# Patient Record
Sex: FEMALE | Race: WHITE | HISPANIC OR LATINO | Employment: STUDENT | URBAN - METROPOLITAN AREA
[De-identification: names, ages, dates, MRNs, and addresses within clinical notes are randomized per-mention and may not be internally consistent; named-entity substitution may affect disease eponyms.]

---

## 2023-08-18 ENCOUNTER — APPOINTMENT (OUTPATIENT)
Dept: LAB | Facility: CLINIC | Age: 23
End: 2023-08-18
Payer: COMMERCIAL

## 2023-08-18 DIAGNOSIS — Z02.1 PRE-EMPLOYMENT HEALTH SCREENING EXAMINATION: ICD-10-CM

## 2023-08-18 LAB
MEV IGG SER QL IA: ABNORMAL
MUV IGG SER QL IA: NORMAL
RUBV IGG SERPL IA-ACNC: 36 IU/ML
VZV IGG SER QL IA: ABNORMAL

## 2023-08-18 PROCEDURE — 86762 RUBELLA ANTIBODY: CPT

## 2023-08-18 PROCEDURE — 86480 TB TEST CELL IMMUN MEASURE: CPT

## 2023-08-18 PROCEDURE — 36415 COLL VENOUS BLD VENIPUNCTURE: CPT

## 2023-08-18 PROCEDURE — 86735 MUMPS ANTIBODY: CPT

## 2023-08-18 PROCEDURE — 86765 RUBEOLA ANTIBODY: CPT

## 2023-08-18 PROCEDURE — 86787 VARICELLA-ZOSTER ANTIBODY: CPT

## 2023-08-22 LAB
GAMMA INTERFERON BACKGROUND BLD IA-ACNC: 0.03 IU/ML
M TB IFN-G BLD-IMP: NEGATIVE
M TB IFN-G CD4+ BCKGRND COR BLD-ACNC: 0 IU/ML
M TB IFN-G CD4+ BCKGRND COR BLD-ACNC: 0 IU/ML
MITOGEN IGNF BCKGRD COR BLD-ACNC: >10 IU/ML

## 2023-11-12 ENCOUNTER — OFFICE VISIT (OUTPATIENT)
Dept: URGENT CARE | Facility: CLINIC | Age: 23
End: 2023-11-12
Payer: COMMERCIAL

## 2023-11-12 VITALS
HEIGHT: 61 IN | BODY MASS INDEX: 25.37 KG/M2 | RESPIRATION RATE: 17 BRPM | TEMPERATURE: 97.8 F | OXYGEN SATURATION: 99 % | WEIGHT: 134.4 LBS | HEART RATE: 87 BPM | DIASTOLIC BLOOD PRESSURE: 80 MMHG | SYSTOLIC BLOOD PRESSURE: 110 MMHG

## 2023-11-12 DIAGNOSIS — R30.0 DYSURIA: ICD-10-CM

## 2023-11-12 DIAGNOSIS — N39.0 URINARY TRACT INFECTION WITHOUT HEMATURIA, SITE UNSPECIFIED: Primary | ICD-10-CM

## 2023-11-12 LAB
SL AMB  POCT GLUCOSE, UA: ABNORMAL
SL AMB LEUKOCYTE ESTERASE,UA: ABNORMAL
SL AMB POCT BILIRUBIN,UA: ABNORMAL
SL AMB POCT BLOOD,UA: ABNORMAL
SL AMB POCT CLARITY,UA: CLEAR
SL AMB POCT COLOR,UA: ABNORMAL
SL AMB POCT KETONES,UA: ABNORMAL
SL AMB POCT NITRITE,UA: ABNORMAL
SL AMB POCT PH,UA: 6
SL AMB POCT SPECIFIC GRAVITY,UA: 1.01
SL AMB POCT URINE PROTEIN: ABNORMAL
SL AMB POCT UROBILINOGEN: 0.2

## 2023-11-12 PROCEDURE — 99213 OFFICE O/P EST LOW 20 MIN: CPT | Performed by: PHYSICIAN ASSISTANT

## 2023-11-12 PROCEDURE — 81002 URINALYSIS NONAUTO W/O SCOPE: CPT | Performed by: PHYSICIAN ASSISTANT

## 2023-11-12 RX ORDER — NITROFURANTOIN 25; 75 MG/1; MG/1
100 CAPSULE ORAL 2 TIMES DAILY
Qty: 10 CAPSULE | Refills: 0 | Status: SHIPPED | OUTPATIENT
Start: 2023-11-12 | End: 2023-11-17

## 2023-11-12 NOTE — PROGRESS NOTES
[facility]        NAME: Justina Alonso is a 26 y.o. female  : 2000    MRN: [id number]  DATE: 2023  TIME: 12:31 PM    Assessment and Plan   Urinary tract infection without hematuria, site unspecified [N39.0]  1. Urinary tract infection without hematuria, site unspecified  nitrofurantoin (MACROBID) 100 mg capsule      2. Dysuria  POCT urine dip        Patient Instructions   Urinary tract infection  UA dip- blood and leuks, urine culture and sensitivity sent out and will call if we need to change antibiotic  rx macrobid twice daily x 5 days  Increase fluid intake  Tylenol/ibuprofen as needed for pain  azostat as needed for pain    Follow up with PCP in 3-5 days. Proceed to  ER if symptoms worsen. Chief Complaint     Chief Complaint   Patient presents with    burning sensation during voiding     Burning sensation when voiding a cfew days ago. Afebrile when symptoms started         History of Present Illness       Radha Ventura is a 59-year-old female who presents to clinic complaining of dysuria x5 days. She also notes suprapubic pressure/pain after urination. She denies any fever, chills, hematuria, increased urinary frequency, urinary urgency, back pain, flank pain, or vaginal symptoms. Review of Systems   Review of Systems   Constitutional:  Negative for chills and fever. Gastrointestinal:  Positive for abdominal pain. Genitourinary:  Positive for dysuria. Negative for flank pain, frequency, hematuria, urgency, vaginal bleeding, vaginal discharge and vaginal pain. Musculoskeletal:  Negative for back pain.          Current Medications       Current Outpatient Medications:     nitrofurantoin (MACROBID) 100 mg capsule, Take 1 capsule (100 mg total) by mouth 2 (two) times a day for 5 days, Disp: 10 capsule, Rfl: 0    famotidine (PEPCID) 20 mg tablet, Take 1 tablet (20 mg total) by mouth 2 (two) times a day (Patient not taking: Reported on 2023), Disp: 30 tablet, Rfl: 0    lidocaine (Lidoderm) 5 %, Apply 1 patch topically over 12 hours daily as needed (back pain) Remove & Discard patch within 12 hours or as directed by MD (Patient not taking: Reported on 11/12/2023), Disp: 15 patch, Rfl: 0    ondansetron (Zofran ODT) 4 mg disintegrating tablet, Take 1 tablet (4 mg total) by mouth every 6 (six) hours as needed for nausea or vomiting (Patient not taking: Reported on 7/28/2023), Disp: 10 tablet, Rfl: 0    sodium chloride 1 g tablet, Take 1 tablet (1 g total) by mouth 2 (two) times a day (Patient not taking: Reported on 7/28/2023), Disp: 60 tablet, Rfl: 0    Current Allergies     Allergies as of 11/12/2023    (No Known Allergies)            The following portions of the patient's history were reviewed and updated as appropriate: allergies, current medications, past family history, past medical history, past social history, past surgical history and problem list.     Past Medical History:   Diagnosis Date    Gallstones     Internal hordeolum of right eye 8/8/2017    Migraines        History reviewed. No pertinent surgical history. Family History   Problem Relation Age of Onset    No Known Problems Mother     No Known Problems Father          Medications have been verified. Objective   /80   Pulse 87   Temp 97.8 °F (36.6 °C) (Tympanic)   Resp 17   Ht 5' 1" (1.549 m)   Wt 61 kg (134 lb 6.4 oz)   SpO2 99%   BMI 25.39 kg/m²   No LMP recorded. Physical Exam     Physical Exam  Vitals and nursing note reviewed. Constitutional:       General: She is not in acute distress. Appearance: Normal appearance. She is not ill-appearing. Cardiovascular:      Rate and Rhythm: Normal rate and regular rhythm. Heart sounds: Normal heart sounds. Pulmonary:      Effort: Pulmonary effort is normal.      Breath sounds: Normal breath sounds. Abdominal:      General: Bowel sounds are normal. There is no distension. Palpations: Abdomen is soft.       Tenderness: There is no abdominal tenderness. There is no right CVA tenderness, left CVA tenderness, guarding or rebound. Neurological:      Mental Status: She is alert and oriented to person, place, and time.    Psychiatric:         Mood and Affect: Mood normal.         Behavior: Behavior normal.

## 2023-11-14 LAB
BACTERIA UR CULT: NORMAL
Lab: NORMAL

## 2023-11-28 ENCOUNTER — APPOINTMENT (EMERGENCY)
Dept: RADIOLOGY | Facility: HOSPITAL | Age: 23
End: 2023-11-28
Payer: COMMERCIAL

## 2023-11-28 ENCOUNTER — HOSPITAL ENCOUNTER (OUTPATIENT)
Facility: HOSPITAL | Age: 23
Setting detail: OBSERVATION
Discharge: HOME | End: 2023-11-30
Attending: EMERGENCY MEDICINE | Admitting: INTERNAL MEDICINE
Payer: COMMERCIAL

## 2023-11-28 ENCOUNTER — HOSPITAL ENCOUNTER (EMERGENCY)
Facility: HOSPITAL | Age: 23
Discharge: HOME | End: 2023-11-28
Attending: EMERGENCY MEDICINE
Payer: COMMERCIAL

## 2023-11-28 VITALS
RESPIRATION RATE: 18 BRPM | OXYGEN SATURATION: 99 % | TEMPERATURE: 98.4 F | HEART RATE: 94 BPM | SYSTOLIC BLOOD PRESSURE: 109 MMHG | DIASTOLIC BLOOD PRESSURE: 58 MMHG

## 2023-11-28 DIAGNOSIS — O03.4 INCOMPLETE ABORTION: ICD-10-CM

## 2023-11-28 DIAGNOSIS — O03.9 SPONTANEOUS ABORTION: ICD-10-CM

## 2023-11-28 DIAGNOSIS — O03.4 INCOMPLETE ABORTION: Primary | ICD-10-CM

## 2023-11-28 DIAGNOSIS — N93.9 VAGINAL BLEEDING: Primary | ICD-10-CM

## 2023-11-28 DIAGNOSIS — D62 ABLA (ACUTE BLOOD LOSS ANEMIA): ICD-10-CM

## 2023-11-28 DIAGNOSIS — N93.9 VAGINAL BLEEDING: ICD-10-CM

## 2023-11-28 DIAGNOSIS — D64.9 SYMPTOMATIC ANEMIA: ICD-10-CM

## 2023-11-28 PROBLEM — R11.0 NAUSEA: Status: ACTIVE | Noted: 2023-11-28

## 2023-11-28 LAB
ABO GROUP BLD: NORMAL
ABO GROUP BLD: NORMAL
ANION GAP SERPL CALCULATED.3IONS-SCNC: 7 MMOL/L
B-HCG SERPL-ACNC: 1425 MIU/ML (ref 0–5)
BACTERIA UR QL AUTO: ABNORMAL /HPF
BASOPHILS # BLD AUTO: 0.02 THOUSANDS/ÂΜL (ref 0–0.1)
BASOPHILS NFR BLD AUTO: 0 % (ref 0–1)
BILIRUB UR QL STRIP: NEGATIVE
BLD GP AB SCN SERPL QL: NEGATIVE
BLD GP AB SCN SERPL QL: NEGATIVE
BUN SERPL-MCNC: 11 MG/DL (ref 5–25)
CALCIUM SERPL-MCNC: 8.4 MG/DL (ref 8.4–10.2)
CHLORIDE SERPL-SCNC: 105 MMOL/L (ref 96–108)
CLARITY UR: ABNORMAL
CO2 SERPL-SCNC: 22 MMOL/L (ref 21–32)
COLOR UR: ABNORMAL
CREAT SERPL-MCNC: 0.66 MG/DL (ref 0.6–1.3)
EOSINOPHIL # BLD AUTO: 0.01 THOUSAND/ÂΜL (ref 0–0.61)
EOSINOPHIL NFR BLD AUTO: 0 % (ref 0–6)
ERYTHROCYTE [DISTWIDTH] IN BLOOD BY AUTOMATED COUNT: 13.8 % (ref 11.6–15.1)
ERYTHROCYTE [DISTWIDTH] IN BLOOD BY AUTOMATED COUNT: 14 % (ref 11.6–15.1)
GFR SERPL CREATININE-BSD FRML MDRD: 126 ML/MIN/1.73SQ M
GLUCOSE SERPL-MCNC: 122 MG/DL (ref 65–140)
GLUCOSE UR STRIP-MCNC: NEGATIVE MG/DL
HCG SERPL QL: POSITIVE
HCT VFR BLD AUTO: 23.5 % (ref 34.8–46.1)
HCT VFR BLD AUTO: 31 % (ref 34.8–46.1)
HGB BLD-MCNC: 10.3 G/DL (ref 11.5–15.4)
HGB BLD-MCNC: 8 G/DL (ref 11.5–15.4)
HGB BLD-MCNC: 9.6 G/DL (ref 11.5–15.4)
HGB UR QL STRIP.AUTO: ABNORMAL
IMM GRANULOCYTES # BLD AUTO: 0.1 THOUSAND/UL (ref 0–0.2)
IMM GRANULOCYTES NFR BLD AUTO: 1 % (ref 0–2)
KETONES UR STRIP-MCNC: NEGATIVE MG/DL
LEUKOCYTE ESTERASE UR QL STRIP: NEGATIVE
LYMPHOCYTES # BLD AUTO: 2.33 THOUSANDS/ÂΜL (ref 0.6–4.47)
LYMPHOCYTES NFR BLD AUTO: 19 % (ref 14–44)
MCH RBC QN AUTO: 27.3 PG (ref 26.8–34.3)
MCH RBC QN AUTO: 27.9 PG (ref 26.8–34.3)
MCHC RBC AUTO-ENTMCNC: 33.2 G/DL (ref 31.4–37.4)
MCHC RBC AUTO-ENTMCNC: 34 G/DL (ref 31.4–37.4)
MCV RBC AUTO: 82 FL (ref 82–98)
MCV RBC AUTO: 82 FL (ref 82–98)
MONOCYTES # BLD AUTO: 0.69 THOUSAND/ÂΜL (ref 0.17–1.22)
MONOCYTES NFR BLD AUTO: 6 % (ref 4–12)
MUCOUS THREADS UR QL AUTO: ABNORMAL
NEUTROPHILS # BLD AUTO: 9.47 THOUSANDS/ÂΜL (ref 1.85–7.62)
NEUTS SEG NFR BLD AUTO: 74 % (ref 43–75)
NITRITE UR QL STRIP: NEGATIVE
NON-SQ EPI CELLS URNS QL MICRO: ABNORMAL /HPF
NRBC BLD AUTO-RTO: 0 /100 WBCS
PH UR STRIP.AUTO: 6 [PH]
PLATELET # BLD AUTO: 205 THOUSANDS/UL (ref 149–390)
PLATELET # BLD AUTO: 234 THOUSANDS/UL (ref 149–390)
PMV BLD AUTO: 9.2 FL (ref 8.9–12.7)
PMV BLD AUTO: 9.6 FL (ref 8.9–12.7)
POTASSIUM SERPL-SCNC: 3.5 MMOL/L (ref 3.5–5.3)
PROT UR STRIP-MCNC: ABNORMAL MG/DL
RBC # BLD AUTO: 2.87 MILLION/UL (ref 3.81–5.12)
RBC # BLD AUTO: 3.77 MILLION/UL (ref 3.81–5.12)
RBC #/AREA URNS AUTO: ABNORMAL /HPF
RH BLD: POSITIVE
RH BLD: POSITIVE
SODIUM SERPL-SCNC: 134 MMOL/L (ref 135–147)
SP GR UR STRIP.AUTO: 1.02 (ref 1–1.03)
SPECIMEN EXPIRATION DATE: NORMAL
SPECIMEN EXPIRATION DATE: NORMAL
UROBILINOGEN UR QL STRIP.AUTO: 0.2 E.U./DL
WBC # BLD AUTO: 12.34 THOUSAND/UL (ref 4.31–10.16)
WBC # BLD AUTO: 12.62 THOUSAND/UL (ref 4.31–10.16)
WBC #/AREA URNS AUTO: ABNORMAL /HPF

## 2023-11-28 PROCEDURE — 86901 BLOOD TYPING SEROLOGIC RH(D): CPT | Performed by: EMERGENCY MEDICINE

## 2023-11-28 PROCEDURE — 85018 HEMOGLOBIN: CPT | Performed by: EMERGENCY MEDICINE

## 2023-11-28 PROCEDURE — 99285 EMERGENCY DEPT VISIT HI MDM: CPT | Performed by: EMERGENCY MEDICINE

## 2023-11-28 PROCEDURE — 86850 RBC ANTIBODY SCREEN: CPT | Performed by: EMERGENCY MEDICINE

## 2023-11-28 PROCEDURE — 99284 EMERGENCY DEPT VISIT MOD MDM: CPT

## 2023-11-28 PROCEDURE — 76815 OB US LIMITED FETUS(S): CPT

## 2023-11-28 PROCEDURE — 96375 TX/PRO/DX INJ NEW DRUG ADDON: CPT

## 2023-11-28 PROCEDURE — 96365 THER/PROPH/DIAG IV INF INIT: CPT

## 2023-11-28 PROCEDURE — 76816 OB US FOLLOW-UP PER FETUS: CPT

## 2023-11-28 PROCEDURE — 76817 TRANSVAGINAL US OBSTETRIC: CPT

## 2023-11-28 PROCEDURE — 86900 BLOOD TYPING SEROLOGIC ABO: CPT | Performed by: EMERGENCY MEDICINE

## 2023-11-28 PROCEDURE — 86923 COMPATIBILITY TEST ELECTRIC: CPT

## 2023-11-28 PROCEDURE — 36415 COLL VENOUS BLD VENIPUNCTURE: CPT | Performed by: EMERGENCY MEDICINE

## 2023-11-28 PROCEDURE — 85025 COMPLETE CBC W/AUTO DIFF WBC: CPT | Performed by: EMERGENCY MEDICINE

## 2023-11-28 PROCEDURE — 84703 CHORIONIC GONADOTROPIN ASSAY: CPT | Performed by: EMERGENCY MEDICINE

## 2023-11-28 PROCEDURE — 84702 CHORIONIC GONADOTROPIN TEST: CPT | Performed by: EMERGENCY MEDICINE

## 2023-11-28 PROCEDURE — 85027 COMPLETE CBC AUTOMATED: CPT | Performed by: EMERGENCY MEDICINE

## 2023-11-28 PROCEDURE — 96361 HYDRATE IV INFUSION ADD-ON: CPT

## 2023-11-28 PROCEDURE — 80048 BASIC METABOLIC PNL TOTAL CA: CPT | Performed by: EMERGENCY MEDICINE

## 2023-11-28 PROCEDURE — 81001 URINALYSIS AUTO W/SCOPE: CPT | Performed by: EMERGENCY MEDICINE

## 2023-11-28 PROCEDURE — 96360 HYDRATION IV INFUSION INIT: CPT

## 2023-11-28 RX ORDER — KETOROLAC TROMETHAMINE 30 MG/ML
15 INJECTION, SOLUTION INTRAMUSCULAR; INTRAVENOUS ONCE
Status: COMPLETED | OUTPATIENT
Start: 2023-11-28 | End: 2023-11-28

## 2023-11-28 RX ORDER — SODIUM CHLORIDE 9 MG/ML
100 INJECTION, SOLUTION INTRAVENOUS CONTINUOUS
Status: DISCONTINUED | OUTPATIENT
Start: 2023-11-28 | End: 2023-11-30 | Stop reason: HOSPADM

## 2023-11-28 RX ORDER — MISOPROSTOL 200 UG/1
800 TABLET ORAL ONCE
Status: COMPLETED | OUTPATIENT
Start: 2023-11-28 | End: 2023-11-28

## 2023-11-28 RX ORDER — ONDANSETRON 2 MG/ML
4 INJECTION INTRAMUSCULAR; INTRAVENOUS EVERY 4 HOURS PRN
Status: DISCONTINUED | OUTPATIENT
Start: 2023-11-28 | End: 2023-11-30 | Stop reason: HOSPADM

## 2023-11-28 RX ADMIN — KETOROLAC TROMETHAMINE 15 MG: 30 INJECTION, SOLUTION INTRAMUSCULAR; INTRAVENOUS at 11:26

## 2023-11-28 RX ADMIN — SODIUM CHLORIDE 1000 ML: 0.9 INJECTION, SOLUTION INTRAVENOUS at 20:22

## 2023-11-28 RX ADMIN — MISOPROSTOL 800 MCG: 200 TABLET ORAL at 10:32

## 2023-11-28 RX ADMIN — SODIUM CHLORIDE 100 ML/HR: 0.9 INJECTION, SOLUTION INTRAVENOUS at 22:44

## 2023-11-28 RX ADMIN — IRON SUCROSE 200 MG: 20 INJECTION, SOLUTION INTRAVENOUS at 12:22

## 2023-11-28 NOTE — DISCHARGE INSTRUCTIONS
You need to call to follow up with OB. Ask to be seen this week as you are potientially having a miscarriage and they should see you for follow up care and to treng your HCG (the pregnancy hormone). Today you were at 1,425. Please return to the ER if you have worsening abdominal pain or if you start to feel week or have worsening vaginal bleeding.

## 2023-11-28 NOTE — ED PROVIDER NOTES
History  Chief Complaint   Patient presents with    Vaginal Bleeding     Patient c/o heavy menstrual bleeding, stating she is currently going through one pad every 30 mins. Also c/o some dizziness and nausea. States her periods are usually irregular but much worse this month. 77-year-old female past medical history significant for irregular periods in the past presenting to the ED today with vaginal bleeding, nausea and lightheadedness. Patient states that she has been having irregular periods where every once a while she will miss one of her periods but then the next period is heavier. She says at that time it has been different because she is having severe nausea as well as sensation of lightheadedness. She has not had any episode of syncope. She is going through about 1 pad every 1/2 hour. She says that this is increased in flow since it started about 2 days ago. She says that she does not think that she could be pregnant. She has seen OB before in the past however this was 1 year ago. Prior to Admission Medications   Prescriptions Last Dose Informant Patient Reported?  Taking?   famotidine (PEPCID) 20 mg tablet   No No   Sig: Take 1 tablet (20 mg total) by mouth 2 (two) times a day   Patient not taking: Reported on 7/28/2023   lidocaine (Lidoderm) 5 %   No No   Sig: Apply 1 patch topically over 12 hours daily as needed (back pain) Remove & Discard patch within 12 hours or as directed by MD   Patient not taking: Reported on 11/12/2023   ondansetron (Zofran ODT) 4 mg disintegrating tablet   No No   Sig: Take 1 tablet (4 mg total) by mouth every 6 (six) hours as needed for nausea or vomiting   Patient not taking: Reported on 7/28/2023   sodium chloride 1 g tablet   No No   Sig: Take 1 tablet (1 g total) by mouth 2 (two) times a day   Patient not taking: Reported on 7/28/2023      Facility-Administered Medications: None       Past Medical History:   Diagnosis Date    Gallstones     Internal maryolum of right eye 8/8/2017    Migraines        History reviewed. No pertinent surgical history. Family History   Problem Relation Age of Onset    No Known Problems Mother     No Known Problems Father      I have reviewed and agree with the history as documented. E-Cigarette/Vaping    E-Cigarette Use Never User      E-Cigarette/Vaping Substances    Nicotine No     THC No     CBD No     Flavoring No     Other No     Unknown No      Social History     Tobacco Use    Smoking status: Never    Smokeless tobacco: Never   Vaping Use    Vaping Use: Never used   Substance Use Topics    Alcohol use: No    Drug use: No       Review of Systems   Constitutional:  Negative for chills and fever. HENT:  Negative for hearing loss. Eyes:  Negative for visual disturbance. Respiratory:  Negative for shortness of breath. Cardiovascular:  Negative for chest pain. Gastrointestinal:  Negative for abdominal pain, constipation, diarrhea, nausea and vomiting. Genitourinary:  Positive for vaginal bleeding. Musculoskeletal:  Negative for myalgias. Skin:  Negative for color change. Neurological:  Negative for dizziness and headaches. Psychiatric/Behavioral:  Negative for agitation. All other systems reviewed and are negative. Physical Exam  Physical Exam  Vitals and nursing note reviewed. Constitutional:       General: She is not in acute distress. Appearance: Normal appearance. She is well-developed. She is not ill-appearing. HENT:      Head: Normocephalic and atraumatic. Right Ear: External ear normal.      Left Ear: External ear normal.      Nose: Nose normal. No congestion. Mouth/Throat:      Mouth: Mucous membranes are moist.      Pharynx: Oropharynx is clear. No oropharyngeal exudate. Eyes:      General:         Right eye: No discharge. Left eye: No discharge. Extraocular Movements: Extraocular movements intact.       Conjunctiva/sclera: Conjunctivae normal. Pupils: Pupils are equal, round, and reactive to light. Cardiovascular:      Rate and Rhythm: Normal rate and regular rhythm. Heart sounds: Normal heart sounds. No murmur heard. No friction rub. No gallop. Pulmonary:      Effort: Pulmonary effort is normal. No respiratory distress. Breath sounds: Normal breath sounds. No stridor. No wheezing. Abdominal:      General: Bowel sounds are normal. There is no distension. Palpations: Abdomen is soft. Tenderness: There is no abdominal tenderness. Musculoskeletal:         General: No swelling. Normal range of motion. Cervical back: Normal range of motion and neck supple. No rigidity. Skin:     General: Skin is warm and dry. Capillary Refill: Capillary refill takes less than 2 seconds. Neurological:      General: No focal deficit present. Mental Status: She is alert and oriented to person, place, and time. Mental status is at baseline. Cranial Nerves: No cranial nerve deficit. Sensory: No sensory deficit. Motor: No weakness.       Gait: Gait normal.   Psychiatric:         Mood and Affect: Mood normal.         Behavior: Behavior normal.         Vital Signs  ED Triage Vitals   Temperature Pulse Respirations Blood Pressure SpO2   11/28/23 0414 11/28/23 0414 11/28/23 0414 11/28/23 0414 11/28/23 0414   98.4 °F (36.9 °C) 94 18 119/60 100 %      Temp src Heart Rate Source Patient Position - Orthostatic VS BP Location FiO2 (%)   -- 11/28/23 0414 11/28/23 0815 11/28/23 0915 --    Monitor Lying Left arm       Pain Score       11/28/23 1126       8           Vitals:    11/28/23 1115 11/28/23 1145 11/28/23 1245 11/28/23 1345   BP: 109/74 110/63 101/69 109/58   Pulse: 86 88 102 94   Patient Position - Orthostatic VS: Lying Lying Lying Sitting         Visual Acuity      ED Medications  Medications   miSOPROStol (Cytotec) tablet 800 mcg (800 mcg Oral Given 11/28/23 1032)   ketorolac (TORADOL) injection 15 mg (15 mg Intravenous Given 11/28/23 1126)   iron sucrose (VENOFER) 200 mg in sodium chloride 0.9 % 100 mL IVPB (0 mg Intravenous Stopped 11/28/23 1322)       Diagnostic Studies  Results Reviewed       Procedure Component Value Units Date/Time    Hemoglobin [[id number]]  (Abnormal) Collected: 11/28/23 0824    Lab Status: Final result Specimen: Blood from Arm, Left Updated: 11/28/23 0835     Hemoglobin 9.6 g/dL     hCG, quantitative [[id number]]  (Abnormal) Collected: 11/28/23 0426    Lab Status: Final result Specimen: Blood from Arm, Right Updated: 11/28/23 0526     HCG, Quant 1,425 mIU/mL     Narrative:       Expected Ranges:    HCG results between 5 and 25 mIU/mL may be indicative of early pregnancy but should be interpreted in light of the total clinical presentation. HCG can rise to detectable levels in dione and post menopausal women (0-11.6 mIU/mL).      Approximate               Approximate HCG  Gestation age          Concentration ( mIU/mL)  _____________          ______________________   Walter Gibson                      HCG values  0.2-1                       5-50  1-2                           2-3                         100-5000  3-4                         500-75284  4-5                         1000-61836  5-6                         10857-173665  6-8                         [id number]  8-12                        [id number]      hCG, qualitative pregnancy [[id number]]  (Abnormal) Collected: 11/28/23 0426    Lab Status: Final result Specimen: Blood from Arm, Right Updated: 11/28/23 0516     Preg, Serum Positive    CBC and Platelet [[id number]]  (Abnormal) Collected: 11/28/23 0426    Lab Status: Final result Specimen: Blood from Arm, Right Updated: 11/28/23 0431     WBC 12.34 Thousand/uL      RBC 3.77 Million/uL      Hemoglobin 10.3 g/dL      Hematocrit 31.0 %      MCV 82 fL      MCH 27.3 pg      MCHC 33.2 g/dL      RDW 13.8 %      Platelets 890 Thousands/uL      MPV 9.2 fL                    US OB pregnancy limited with transvaginal   Final Result by Fide Valdez MD (11/28 2221)   Small gestational sac without a fetal pole or embryo. This is likely due to the early gestational age. Continued follow-up is advised. Possible blood products within the endometrial canal.            Workstation performed: [id number]                    Procedures  Procedures         ED Course  ED Course as of 11/28/23 2150   Tue Nov 28, 2023   0433 Hemoglobin(!): 10.3  12.9 2 years ago   0528 HCG QUANTITATIVE(!): 1,425  With this positive pregnancy and the vaginal bleeding must rule out ectopic pregnancy. US r/o ectopic ordered. Delay to Care because no one was listed in the tiger text role. I had to call reading room and they gave me name of [facility]. Will tiger text and attempt to reach them. Patient still hemodynamically stable at this time. SBIRT 22yo+      Flowsheet Row Most Recent Value   Initial Alcohol Screen: US AUDIT-C     1. How often do you have a drink containing alcohol? 0 Filed at: 11/28/2023 0415   2. How many drinks containing alcohol do you have on a typical day you are drinking? 0 Filed at: 11/28/2023 0415   3b. FEMALE Any Age, or MALE 65+: How often do you have 4 or more drinks on one occassion? 0 Filed at: 11/28/2023 0415   Audit-C Score 0 Filed at: 11/28/2023 2848   LUDMILA: How many times in the past year have you. .. Used an illegal drug or used a prescription medication for non-medical reasons? Never Filed at: 11/28/2023 0415                      Medical Decision Making  51-year-old female presenting to ED today with vaginal bleeding. At this time would like to get a pregnancy test to evaluate for possible miscarriage however if she is also positive for pregnancy will need to make sure there is not an ectopic pregnancy. Otherwise she is currently hemodynamically stable right now and so we will hold off on type and screen.   Will get a CBC and platelet to evaluate for acute blood loss anemia secondary to menorrhagia. If CBC is normal and patient is not pregnant we will have her follow-up with OB. Will send her home with prescription for nausea medication (Zofran). Patient signed out prior to final dispo. Amount and/or Complexity of Data Reviewed  Labs: ordered. Decision-making details documented in ED Course. Radiology: ordered. Risk  Prescription drug management. Disposition  Final diagnoses:   Vaginal bleeding   Incomplete      Time reflects when diagnosis was documented in both MDM as applicable and the Disposition within this note       Time User Action Codes Description Comment    2023  4:43 AM Geradine Clines Add [N93.9] Abnormal uterine bleeding     2023  4:43 AM Milton Arnold Remove [N93.9] Abnormal uterine bleeding     2023  4:43 AM Milton Arnold Add [N92.1] Menorrhagia with irregular cycle     2023  5:43 AM Milton Arnold Remove [N92.1] Menorrhagia with irregular cycle     2023  8:01 AM Simon Shelling Add [O20.0] Threatened miscarriage in early pregnancy     2023  8:01 AM Simon Shelling Add [N93.9] Vaginal bleeding     2023 12:16 PM Judyann Damme [N93.9] Vaginal bleeding     2023 12:16 PM Simno Shelling Remove [O20.0] Threatened miscarriage in early pregnancy     2023 12:16 PM Simon Shelling Add [O03.4] Incomplete      2023 12:16 PM Simon Shelling Modify [N93.9] Vaginal bleeding     2023 12:16 PM Simon Shelling Modify [O03.4] Incomplete            ED Disposition       ED Disposition   Discharge    Condition   Stable    Date/Time    [facility] discharge to home/self care.                    Follow-up Information       Follow up With Specialties Details Why Contact Info Additional [address] Obstetrics and Gynecology Schedule an appointment as soon as possible for a visit for follow up [address]  [phone] [facility] Peg Rome, [address], [facility], [address]    [address] Emergency Department Emergency Medicine Go to  If symptoms worsen, As needed [address] [facility] Emergency Department, [address], [facility], [address]            Discharge Medication List as of 11/28/2023 12:45 PM        CONTINUE these medications which have NOT CHANGED    Details   famotidine (PEPCID) 20 mg tablet Take 1 tablet (20 mg total) by mouth 2 (two) times a day, Starting Fri 6/23/2023, Normal      lidocaine (Lidoderm) 5 % Apply 1 patch topically over 12 hours daily as needed (back pain) Remove & Discard patch within 12 hours or as directed by MD, Starting Fri 7/28/2023, Normal      ondansetron (Zofran ODT) 4 mg disintegrating tablet Take 1 tablet (4 mg total) by mouth every 6 (six) hours as needed for nausea or vomiting, Starting Fri 6/23/2023, Normal      sodium chloride 1 g tablet Take 1 tablet (1 g total) by mouth 2 (two) times a day, Starting Fri 10/7/2022, Normal             No discharge procedures on file.     PDMP Review       None            ED Provider  Electronically Signed by             Laurel Palmer MD  11/28/23 4192

## 2023-11-28 NOTE — ED CARE HANDOFF
Emergency Department Sign Out Note        Sign out and transfer of care from Dr. Bethany Hobbs. See Separate Emergency Department note. The patient, Parvin Puente, was evaluated by the previous provider for vaginal bleeding. Workup Completed:  Labs    ED Course / Workup Pending (followup):  US and reassessment                                  ED Course as of 23 1332   Tue 2023   0740 SO: Vag bleeding; Found to be pregnant; Awaiting US read; DC with Ob f/u if negative   0800 US OB pregnancy limited with transvaginal  Small gestational sac without a fetal pole or embryo. This is likely due to the early gestational age. Continued follow-up is advised. Possible blood products within the endometrial canal.   0820 Reassessed pt who states she has changed her pad twice since arrival. Vital signs reobtained and pt now mildly tachycardic.    0836 Hemoglobin(!): 9.6  Continued to drop and likely not completely indicative of true level as acute blood loss. 0570 Pulse(!): 108   0911 Pelvic exam performed. Some clots removed from the vaginal canal. No obvious products within the cervix. Consistent oozing of blood from the cervix. [address]  Discussed with Dr. Amanda Chino of [address] who stated pt sounds as though she has incomplete , recommending Cytotec and monitoring in the ED. Discussed this with pt who is agreeable. 1032 Cytotec given. 1112 Pt reporting a lot of cramping. Will order medication. 1218 Reassessed pt who is resting more comfortably. Pt reports that she passed some clots the last time she was in the bathroom but the bleeding overall seems to have slowed. Discussed with pt plan for venofer and then DC. Pt comfortable with plan. Procedures  Medical Decision Making  Pt is a 21yo F who presents with vaginal bleeding. See ED course for results and details. Plan to discharge pt with f/u to Ob. Discussed returning the ED with new or worsening of symptoms. Discussed use of over the counter medications as stated on the bottle as needed for pain. Pt expressed understanding of discharge instructions, return precautions, and medication instructions and is stable for discharge at this time. All questions were answered and pt was discharged without incident. Amount and/or Complexity of Data Reviewed  Labs: ordered. Decision-making details documented in ED Course. Radiology: ordered. Decision-making details documented in ED Course. Risk  Prescription drug management. Disposition  Final diagnoses:   Vaginal bleeding   Incomplete      Time reflects when diagnosis was documented in both MDM as applicable and the Disposition within this note       Time User Action Codes Description Comment    2023  4:43 AM Rubén An Add [N93.9] Abnormal uterine bleeding     2023  4:43 AM Milton French Remove [N93.9] Abnormal uterine bleeding     2023  4:43 AM Milton French Add [N92.1] Menorrhagia with irregular cycle     2023  5:43 AM Milton French Remove [N92.1] Menorrhagia with irregular cycle     2023  8:01 AM Diego Moore Add [O20.0] Threatened miscarriage in early pregnancy     2023  8:01 AM Diego Moore Add [N93.9] Vaginal bleeding     2023 12:16 PM Lakeside Gambler [N93.9] Vaginal bleeding     2023 12:16 PM Diego Moore Remove [O20.0] Threatened miscarriage in early pregnancy     2023 12:16 PM Diego Moore Add [O03.4] Incomplete      2023 12:16 PM Diego Moore Modify [N93.9] Vaginal bleeding     2023 12:16 PM Diego Melendrezbush Modify [O03.4] Incomplete            ED Disposition       ED Disposition   Discharge    Condition   Stable    Date/Time    12:16 PM    Comment   Trudy Saenz discharge to home/self care.                    Follow-up Information       Follow up With Specialties Details Why Contact Info Additional Information    [facility] and Gynecology Schedule an appointment as soon as possible for a visit  for follow up [address] [phone] [facility] Agustín Zhong, [address] [facility], [facility], [address] [facility] Agustín Zhong Emergency Department Emergency Medicine Go to  If symptoms worsen, As needed [address] [address]  [address] [facility] Emergency Department, [address] , [facility], [address]          Patient's Medications   Discharge Prescriptions    No medications on file     No discharge procedures on file.        ED Provider  Electronically Signed by     Liz Johnston MD  11/28/23 0424

## 2023-11-28 NOTE — ED NOTES
Patient stated she felt nauseous and has not eaten today. Dr. Mary Beatty approved food. Meal tray left at bedside table.       Alma Galicia RN  11/28/23 4436

## 2023-11-29 ENCOUNTER — APPOINTMENT (OUTPATIENT)
Dept: RADIOLOGY | Facility: HOSPITAL | Age: 23
End: 2023-11-29
Payer: COMMERCIAL

## 2023-11-29 PROBLEM — D62 ABLA (ACUTE BLOOD LOSS ANEMIA): Status: ACTIVE | Noted: 2023-11-28

## 2023-11-29 PROBLEM — R11.0 NAUSEA: Status: RESOLVED | Noted: 2023-11-28 | Resolved: 2023-11-29

## 2023-11-29 LAB
ABO GROUP BLD BPU: NORMAL
ABO GROUP BLD BPU: NORMAL
ANION GAP SERPL CALCULATED.3IONS-SCNC: 3 MMOL/L
APTT PPP: 27 SECONDS (ref 23–37)
B-HCG SERPL-ACNC: 2589 MIU/ML (ref 0–5)
BASOPHILS # BLD AUTO: 0.02 THOUSANDS/ÂΜL (ref 0–0.1)
BASOPHILS # BLD AUTO: 0.03 THOUSANDS/ÂΜL (ref 0–0.1)
BASOPHILS NFR BLD AUTO: 0 % (ref 0–1)
BASOPHILS NFR BLD AUTO: 0 % (ref 0–1)
BPU ID: NORMAL
BPU ID: NORMAL
BUN SERPL-MCNC: 6 MG/DL (ref 5–25)
CALCIUM SERPL-MCNC: 7.8 MG/DL (ref 8.4–10.2)
CHLORIDE SERPL-SCNC: 111 MMOL/L (ref 96–108)
CO2 SERPL-SCNC: 23 MMOL/L (ref 21–32)
CREAT SERPL-MCNC: 0.47 MG/DL (ref 0.6–1.3)
CROSSMATCH: NORMAL
CROSSMATCH: NORMAL
EOSINOPHIL # BLD AUTO: 0.07 THOUSAND/ÂΜL (ref 0–0.61)
EOSINOPHIL # BLD AUTO: 0.08 THOUSAND/ÂΜL (ref 0–0.61)
EOSINOPHIL NFR BLD AUTO: 1 % (ref 0–6)
EOSINOPHIL NFR BLD AUTO: 1 % (ref 0–6)
ERYTHROCYTE [DISTWIDTH] IN BLOOD BY AUTOMATED COUNT: 14.6 % (ref 11.6–15.1)
ERYTHROCYTE [DISTWIDTH] IN BLOOD BY AUTOMATED COUNT: 14.7 % (ref 11.6–15.1)
FERRITIN SERPL-MCNC: 66 NG/ML (ref 11–307)
GFR SERPL CREATININE-BSD FRML MDRD: 141 ML/MIN/1.73SQ M
GLUCOSE SERPL-MCNC: 90 MG/DL (ref 65–140)
HCT VFR BLD AUTO: 18.4 % (ref 34.8–46.1)
HCT VFR BLD AUTO: 28.7 % (ref 34.8–46.1)
HCT VFR BLD AUTO: 29 % (ref 34.8–46.1)
HCT VFR BLD AUTO: 31.8 % (ref 34.8–46.1)
HGB BLD-MCNC: 10 G/DL (ref 11.5–15.4)
HGB BLD-MCNC: 10.9 G/DL (ref 11.5–15.4)
HGB BLD-MCNC: 6.2 G/DL (ref 11.5–15.4)
HGB BLD-MCNC: 9.9 G/DL (ref 11.5–15.4)
IMM GRANULOCYTES # BLD AUTO: 0.09 THOUSAND/UL (ref 0–0.2)
IMM GRANULOCYTES # BLD AUTO: 0.1 THOUSAND/UL (ref 0–0.2)
IMM GRANULOCYTES NFR BLD AUTO: 1 % (ref 0–2)
IMM GRANULOCYTES NFR BLD AUTO: 1 % (ref 0–2)
INR PPP: 1.02 (ref 0.84–1.19)
IRON SERPL-MCNC: 348 UG/DL (ref 50–212)
LYMPHOCYTES # BLD AUTO: 3.64 THOUSANDS/ÂΜL (ref 0.6–4.47)
LYMPHOCYTES # BLD AUTO: 4 THOUSANDS/ÂΜL (ref 0.6–4.47)
LYMPHOCYTES NFR BLD AUTO: 34 % (ref 14–44)
LYMPHOCYTES NFR BLD AUTO: 36 % (ref 14–44)
MAGNESIUM SERPL-MCNC: 1.9 MG/DL (ref 1.9–2.7)
MCH RBC QN AUTO: 28.3 PG (ref 26.8–34.3)
MCH RBC QN AUTO: 29.1 PG (ref 26.8–34.3)
MCHC RBC AUTO-ENTMCNC: 33.3 G/DL (ref 31.4–37.4)
MCHC RBC AUTO-ENTMCNC: 34.5 G/DL (ref 31.4–37.4)
MCV RBC AUTO: 84 FL (ref 82–98)
MCV RBC AUTO: 85 FL (ref 82–98)
MONOCYTES # BLD AUTO: 0.81 THOUSAND/ÂΜL (ref 0.17–1.22)
MONOCYTES # BLD AUTO: 0.82 THOUSAND/ÂΜL (ref 0.17–1.22)
MONOCYTES NFR BLD AUTO: 7 % (ref 4–12)
MONOCYTES NFR BLD AUTO: 8 % (ref 4–12)
NEUTROPHILS # BLD AUTO: 5.59 THOUSANDS/ÂΜL (ref 1.85–7.62)
NEUTROPHILS # BLD AUTO: 6.74 THOUSANDS/ÂΜL (ref 1.85–7.62)
NEUTS SEG NFR BLD AUTO: 54 % (ref 43–75)
NEUTS SEG NFR BLD AUTO: 57 % (ref 43–75)
NRBC BLD AUTO-RTO: 0 /100 WBCS
NRBC BLD AUTO-RTO: 0 /100 WBCS
PLATELET # BLD AUTO: 146 THOUSANDS/UL (ref 149–390)
PLATELET # BLD AUTO: 168 THOUSANDS/UL (ref 149–390)
PMV BLD AUTO: 9.5 FL (ref 8.9–12.7)
PMV BLD AUTO: 9.6 FL (ref 8.9–12.7)
POTASSIUM SERPL-SCNC: 3.6 MMOL/L (ref 3.5–5.3)
PROTHROMBIN TIME: 13.6 SECONDS (ref 11.6–14.5)
RBC # BLD AUTO: 3.4 MILLION/UL (ref 3.81–5.12)
RBC # BLD AUTO: 3.74 MILLION/UL (ref 3.81–5.12)
SODIUM SERPL-SCNC: 137 MMOL/L (ref 135–147)
TIBC SERPL-MCNC: <403 UG/DL (ref 250–450)
UIBC SERPL-MCNC: <55 UG/DL (ref 155–355)
UNIT DISPENSE STATUS: NORMAL
UNIT DISPENSE STATUS: NORMAL
UNIT PRODUCT CODE: NORMAL
UNIT PRODUCT CODE: NORMAL
UNIT PRODUCT VOLUME: 350 ML
UNIT PRODUCT VOLUME: 350 ML
UNIT RH: NORMAL
UNIT RH: NORMAL
WBC # BLD AUTO: 10.25 THOUSAND/UL (ref 4.31–10.16)
WBC # BLD AUTO: 11.74 THOUSAND/UL (ref 4.31–10.16)

## 2023-11-29 PROCEDURE — 85018 HEMOGLOBIN: CPT

## 2023-11-29 PROCEDURE — 83735 ASSAY OF MAGNESIUM: CPT

## 2023-11-29 PROCEDURE — 80048 BASIC METABOLIC PNL TOTAL CA: CPT

## 2023-11-29 PROCEDURE — 82728 ASSAY OF FERRITIN: CPT

## 2023-11-29 PROCEDURE — 83550 IRON BINDING TEST: CPT

## 2023-11-29 PROCEDURE — 85730 THROMBOPLASTIN TIME PARTIAL: CPT | Performed by: INTERNAL MEDICINE

## 2023-11-29 PROCEDURE — 84702 CHORIONIC GONADOTROPIN TEST: CPT

## 2023-11-29 PROCEDURE — 85610 PROTHROMBIN TIME: CPT | Performed by: INTERNAL MEDICINE

## 2023-11-29 PROCEDURE — 83540 ASSAY OF IRON: CPT

## 2023-11-29 PROCEDURE — P9016 RBC LEUKOCYTES REDUCED: HCPCS

## 2023-11-29 PROCEDURE — 85025 COMPLETE CBC W/AUTO DIFF WBC: CPT

## 2023-11-29 PROCEDURE — 99254 IP/OBS CNSLTJ NEW/EST MOD 60: CPT | Performed by: STUDENT IN AN ORGANIZED HEALTH CARE EDUCATION/TRAINING PROGRAM

## 2023-11-29 PROCEDURE — 99223 1ST HOSP IP/OBS HIGH 75: CPT | Performed by: INTERNAL MEDICINE

## 2023-11-29 PROCEDURE — 36415 COLL VENOUS BLD VENIPUNCTURE: CPT

## 2023-11-29 PROCEDURE — 85014 HEMATOCRIT: CPT | Performed by: INTERNAL MEDICINE

## 2023-11-29 PROCEDURE — 85018 HEMOGLOBIN: CPT | Performed by: INTERNAL MEDICINE

## 2023-11-29 PROCEDURE — 76816 OB US FOLLOW-UP PER FETUS: CPT

## 2023-11-29 PROCEDURE — 76817 TRANSVAGINAL US OBSTETRIC: CPT

## 2023-11-29 PROCEDURE — 85014 HEMATOCRIT: CPT

## 2023-11-29 RX ORDER — ACETAMINOPHEN 325 MG/1
650 TABLET ORAL EVERY 6 HOURS PRN
Status: DISCONTINUED | OUTPATIENT
Start: 2023-11-29 | End: 2023-11-30 | Stop reason: HOSPADM

## 2023-11-29 RX ORDER — MISOPROSTOL 200 UG/1
800 TABLET ORAL ONCE
Status: COMPLETED | OUTPATIENT
Start: 2023-11-29 | End: 2023-11-29

## 2023-11-29 RX ORDER — MAGNESIUM SULFATE 1 G/100ML
1 INJECTION INTRAVENOUS ONCE
Status: COMPLETED | OUTPATIENT
Start: 2023-11-29 | End: 2023-11-29

## 2023-11-29 RX ORDER — ACETAMINOPHEN 10 MG/ML
1000 INJECTION, SOLUTION INTRAVENOUS ONCE
Status: COMPLETED | OUTPATIENT
Start: 2023-11-29 | End: 2023-11-29

## 2023-11-29 RX ADMIN — SODIUM CHLORIDE 100 ML/HR: 0.9 INJECTION, SOLUTION INTRAVENOUS at 08:18

## 2023-11-29 RX ADMIN — ACETAMINOPHEN 650 MG: 325 TABLET ORAL at 21:31

## 2023-11-29 RX ADMIN — SODIUM CHLORIDE 100 ML/HR: 0.9 INJECTION, SOLUTION INTRAVENOUS at 21:39

## 2023-11-29 RX ADMIN — MISOPROSTOL 800 MCG: 200 TABLET ORAL at 21:31

## 2023-11-29 RX ADMIN — ACETAMINOPHEN 1000 MG: 10 INJECTION INTRAVENOUS at 22:45

## 2023-11-29 RX ADMIN — MAGNESIUM SULFATE HEPTAHYDRATE 1 G: 1 INJECTION, SOLUTION INTRAVENOUS at 21:31

## 2023-11-29 NOTE — H&P
History and Physical - [facility]  Family Medicine Residency     Patient Information: Jose Alberto Simpson 26 y.o. female MRN: [id number]  Unit/Bed#: ED 01 Encounter: [id number]  Admitting Physician: Nel Yin MD   PCP: Floridalma Fink MD  Date of Admission:  23     Assessment and Plan     * Incomplete   Assessment & Plan  Pt with PMHx of irregular periods (LMP 23) presents with lightheadedness, nausea and active vaginal bleeding since last Wednesday. Sexually active with no contraceptive use & negative home pregnancy test 2 weeks ago. Unintentional pregnancy confirmed with Hcg and US suggestive of spontaneous  in progress. Received cytotec and venofer during initial ED visit this morning.  - ED: ABO/RH positive, rhogam not needed, Hcg 1425, Hgb 10.3  - OB US initial: Small gestational sac without a fetal pole or embryo. This is likely due to the early gestational age. Continued follow-up is advised. Possible blood products within the endometrial canal.  - OB US repeat: Cystic structure likely representing an intrauterine gestation is now more low-lying compared to the earlier ultrasound. Mobile echogenic material in the endometrial cavity and in the vagina, most likely representing blood products. These findings are suggestive of a spontaneous  in progress. Encourage ambulation, advised to dangle feet before standing  Starting IV NS @100mL/hr  Consider repeat cytotec if bleeding worsens without passing retained products of conception   on options for contraception upon discharge    Symptomatic anemia  Assessment & Plan  Symptomatic anemia with lightheadedness and tachycardia 100-110s in ED likely due to active vaginal bleeding. Reports experiencing dizziness, blurred vision, and tinnitus which resolved with food before presenting to ED.  Denies SOB, chest pain, palpitations, or loss of consciousness.  - ED: Received cytotec and venofer  - POA Hgb 10.3 - downtrending  - Currently AFVSS  Repeat H/H  Monitor Vitals  Consider PRBCs if Hgb <7, consent for blood products signed  Consider AM venofer if blood products not indicated  Fall precautions    Nausea  Assessment & Plan  Reports nausea in setting of unintentional pregnancy with active bleeding due to incomplete . Denies any episodes of vomiting. Tolerating food well. Diet regular  Zofran PRN         Fluids: IV NS @ 100mL/hr  Electrolyte repletion: Replete as needed. Nutrition:        Diet Orders   (From admission, onward)                 Start     Ordered    23  Diet Regular; Regular House  Diet effective now        References:    Adult Nutrition Support Algorithm    RD Therapeutic Diet Order Protocol   Question Answer Comment   Diet Type Regular    Regular Regular House    RD to adjust diet per protocol? Yes        23                   VTE Prophylaxis:   Low Risk (Score 0-2) - Encourage Ambulation. Code Status: Level 1 - Full Code  Anticipated Length of Stay:  Patient will be admitted on an Observation basis with an anticipated length of stay of  less than 2 midnights. Justification for Hospital Stay: Incomplete  with Anemia  Total Time for Visit, including Counseling / Coordination of Care: 30 mins. Greater than 50% of this total time spent on direct patient counseling and coordination of care. Chief Complaint:     Chief Complaint   Patient presents with    Vaginal Bleeding - Pregnant     Was here earlier dx with miscarriage. Went home and took a shower and almost passed out. States she feels worse now then earlier       Subjective      History of Present Illness:     Alfreda Knowles is a 26 y.o. female with PMHx of irregular periods presents with vaginal bleeding, nausea and lightheadedness since last Wednesday.  Reports irregular periods with occasional missed periods followed by heavier flow during next cycle, which is why she had low suspicion of pregnancy. Home pregnancy test 2 weeks ago was negative. Patient reports bleeding through 1 pad every 30 mins  since last Wednesday and was concerned due to accompanying symptoms of severe nausea, lightheadedness, blurred vision, and ear ringing that developed in the last two days. Patient denies fever, syncope, chest pain, SOB, vomiting, changes in bowel/urinary habits. ED: Venofer & Cytotec        Review of Systems:  Review of Systems   Constitutional:  Negative for chills, fatigue and fever. HENT:  Negative for ear pain and sore throat. Eyes:  Negative for pain and visual disturbance. Respiratory:  Negative for cough and shortness of breath. Cardiovascular:  Negative for chest pain and palpitations. Gastrointestinal:  Positive for nausea. Negative for abdominal pain (menstrual-like cramping), constipation, diarrhea and vomiting. Genitourinary:  Positive for pelvic pain (menstrual-like cramping) and vaginal bleeding. Negative for dysuria and hematuria. Musculoskeletal:  Negative for arthralgias and back pain. Skin:  Negative for color change and rash. Neurological:  Positive for dizziness and light-headedness. Negative for seizures, syncope, facial asymmetry and headaches. Psychiatric/Behavioral:  Negative for confusion. All other systems reviewed and are negative. Past Medical History:   Diagnosis Date    Gallstones     Internal hordeolum of right eye 8/8/2017    Migraines      History reviewed. No pertinent surgical history. No Known Allergies  Prior to Admission Medications   Prescriptions Last Dose Informant Patient Reported?  Taking?   famotidine (PEPCID) 20 mg tablet   No No   Sig: Take 1 tablet (20 mg total) by mouth 2 (two) times a day   Patient not taking: Reported on 7/28/2023   lidocaine (Lidoderm) 5 %   No No   Sig: Apply 1 patch topically over 12 hours daily as needed (back pain) Remove & Discard patch within 12 hours or as directed by MD   Patient not taking: Reported on 11/12/2023   ondansetron (Zofran ODT) 4 mg disintegrating tablet   No No   Sig: Take 1 tablet (4 mg total) by mouth every 6 (six) hours as needed for nausea or vomiting   Patient not taking: Reported on 7/28/2023   sodium chloride 1 g tablet   No No   Sig: Take 1 tablet (1 g total) by mouth 2 (two) times a day   Patient not taking: Reported on 7/28/2023      Facility-Administered Medications: None     Social History     Socioeconomic History    Marital status: Single     Spouse name: Not on file    Number of children: Not on file    Years of education: Not on file    Highest education level: Not on file   Occupational History    Not on file   Tobacco Use    Smoking status: Never    Smokeless tobacco: Never   Vaping Use    Vaping Use: Never used   Substance and Sexual Activity    Alcohol use: No    Drug use: No    Sexual activity: Yes     Partners: Male     Birth control/protection: Condom Male   Other Topics Concern    Not on file   Social History Narrative    Not on file     Social Determinants of Health     Financial Resource Strain: Not on file   Food Insecurity: Not on file   Transportation Needs: Not on file   Physical Activity: Not on file   Stress: Not on file   Social Connections: Not on file   Intimate Partner Violence: Not on file   Housing Stability: Not on file     Family History   Problem Relation Age of Onset    No Known Problems Mother     No Known Problems Father         Patient Pre-hospital Living Situation: home  Patient Pre-hospital Level of Mobility: full ROM  Patient Pre-hospital Diet Restrictions: regular          Objective     Physical Exam:   Vitals:   Blood Pressure: 98/54 (11/28/23 2330)  Pulse: 94 (11/28/23 2330)  Temperature: 97.9 °F (36.6 °C) (11/28/23 1918)  Respirations: 18 (11/28/23 2330)  Height: 5' 1" (154.9 cm) (11/28/23 1918)  Weight - Scale: 60.8 kg (134 lb) (11/28/23 1918)  SpO2: 98 % (11/28/23 2330)     Physical Exam  Constitutional:       General: She is not in acute distress. Appearance: She is not ill-appearing. HENT:      Mouth/Throat:      Mouth: Mucous membranes are moist.      Pharynx: Oropharynx is clear. Eyes:      General: No scleral icterus. Extraocular Movements: Extraocular movements intact. Conjunctiva/sclera: Conjunctivae normal.      Pupils: Pupils are equal, round, and reactive to light. Cardiovascular:      Rate and Rhythm: Regular rhythm. Tachycardia present. Pulses: Normal pulses. Heart sounds: Normal heart sounds. Pulmonary:      Effort: Pulmonary effort is normal.      Breath sounds: Normal breath sounds. Abdominal:      General: Abdomen is flat. Bowel sounds are normal. There is no distension. Palpations: Abdomen is soft. Tenderness: There is no abdominal tenderness. Genitourinary:     Comments: Active vaginal bleeding  Skin:     General: Skin is warm and dry. Capillary Refill: Capillary refill takes less than 2 seconds. Neurological:      General: No focal deficit present. Mental Status: She is alert and oriented to person, place, and time. Motor: No weakness. Lab Results: I have personally reviewed pertinent reports.     Results from last 7 days   Lab Units 11/28/23 1944   WBC Thousand/uL 12.62*   HEMOGLOBIN g/dL 8.0*   HEMATOCRIT % 23.5*   PLATELETS Thousands/uL 205   NEUTROS PCT % 74   LYMPHS PCT % 19   MONOS PCT % 6   EOS PCT % 0     Results from last 7 days   Lab Units 11/28/23 1944   POTASSIUM mmol/L 3.5   CHLORIDE mmol/L 105   CO2 mmol/L 22   BUN mg/dL 11   CREATININE mg/dL 0.66   CALCIUM mg/dL 8.4   EGFR ml/min/1.73sq m 126                      Results from last 7 days   Lab Units 11/28/23 1957   COLOR UA  Pink   CLARITY UA  Slightly Cloudy   SPEC GRAV UA  1.025   PH UA  6.0   LEUKOCYTES UA  Negative   NITRITE UA  Negative   GLUCOSE UA mg/dl Negative   KETONES UA mg/dl Negative   BILIRUBIN UA  Negative   BLOOD UA  Large*      Results from last 7 days   Lab Units 23   RBC UA /hpf Innumerable*   WBC UA /hpf 2-4   EPITHELIAL CELLS WET PREP /hpf Occasional   BACTERIA UA /hpf Occasional                  Imaging: I have personally reviewed pertinent reports. US OB Follow up with Transvaginal    Result Date: 2023  Cystic structure likely representing an intrauterine gestation is now more low-lying compared to the earlier ultrasound. Mobile echogenic material in the endometrial cavity and in the vagina, most likely representing blood products. These findings are suggestive of a spontaneous  in progress. The study was marked in [facility] for immediate notification. Workstation performed: [id number]     [address]  OB pregnancy limited with transvaginal    Result Date: 2023  Small gestational sac without a fetal pole or embryo. This is likely due to the early gestational age. Continued follow-up is advised.  Possible blood products within the endometrial canal. Workstation performed: [id number]              Entire H&P was discussed with Dr. Ritesh Hernandez who agreed to what is noted above     ** Please Note: This note has been constructed using a voice recognition system Janett Guerrero MD  23  12:02 AM

## 2023-11-29 NOTE — ASSESSMENT & PLAN NOTE
Unintentional pregnancy. Presents with cramping and active vaginal bleeding since last Wednesday. Reports home pregnancy test 2 weeks ago was negative. Not on any contraceptives. Last menstruation was 23. Reports normally having irregular periods, which is why she wasn't concerned. - ED: Received cytotec and venofer  - ABO/RH positive, rhogam not needed. Hcg 1425.  - OB US initial: Small gestational sac without a fetal pole or embryo. This is likely due to the early gestational age. Continued follow-up is advised. Possible blood products within the endometrial canal.  - OB US repeat: Cystic structure likely representing an intrauterine gestation is now more low-lying compared to the earlier ultrasound. Mobile echogenic material in the endometrial cavity and in the vagina, most likely representing blood products. These findings are suggestive of a spontaneous  in progress.   Encourage ambulation  Starting IV hydration  Consider repeat cytotec if bleeding worsens without passing retained products of conception   on options for contraception

## 2023-11-29 NOTE — CONSULTS
Consultation - Gynecology   Rosi Henderson 26 y.o. female MRN: [id number]  Unit/Bed#: [address] [place] [address] Encounter: [id number]    Assessment/Plan     Assessment:  Rosi Henderson is a 26 y.o. Crow Specking with inevitable miscarriage who presents for vaginal bleeding and presyncopal episode    Plan:  Bleeding significantly improved  Reports no cramping, no dizziness, feels better than did upon initial presentation  Hemoglobin increased appropriately after 2U pRBCs this morning from 6.2--> 10.9, most recently 9.9 potentially hemodilutional  Repeat hcg increased, this suggests that pregnancy not yet passed, though possible to have transient increase in hcg prior to expected decrease of 70-99% within 2-14 days  Can consider repeat pelvic US today to assess for passage of tissue  Recommend repeat hcg tomorrow  Reviewed management options including expectant, repeat dose of miso, or D&E  Discussed expectant management--usually will develop bleeding and cramping and pass pregnancy tissue, if saturating a pad in less than an hour for more than a couple of hours, feeling lightheaded or dizzy, or having severe pain not responsive to Tylenol or Motrin she should present for repeat evaluation, and at that point I would recommend consideration of procedural evacuation. Reviewed that with expectant management may still need intervention which could be medications or procedure  Reviewed further medical management with a second dose of misoprostol 800mcg, which may still need further intervention with procedure  Reviewed D&E, which is the most effective, a/w risk of injury to surrounding structures, scar tissue, etc, overall best chance of fastest resolution  She indicated desire to avoid surgical procedure and would like to avoid further intervention now that she's feeling much better. Would like to pursue ultrasound and consider options. Follow-up after repeat imaging and hcg.  Contact provider in [facility] role with any further questions. IF decides to proceed with D&E please make NPO and notify [facility] role ASAP in order to expedite coordination of procedure. History of Present Illness   Physician Requesting Consult: Wolf Carr MD  Reason for Consult / Principal Problem: miscarriage managment  Subspeciality: General GYN  HPI: Tyler Mae is a 26y.o. year old female who presents with missed     Had been having bleeding, saturating a pad every hour  Presented to the ED for evaluation was diagnosed with incomplete miscarriage  Offered misoprostol  Received 800mcg oral cytotec yesterday at 1032AM  Toradol 1126AM  Received IV iron 200mg yesterday at 1222  She was discharged home with precautions, however bleeding significantly increased and she described feeling very weak presyncopal episode, no syncope  Return to the emergency department  Hemoglobin had dropped  She was admitted for observation and blood transfusion  1L bolus at  followed by infurion of 100ml/hr  Received 2 units of pRBCs after midnight  Hemoglobin increased appropriately  She reports that her bleeding is improved significantly  No longer feeling lightheaded or dizzy  Not having any cramping    Reports this was a surprise pregnancy      Inpatient consult to Obstetrics / Gynecology  Consult performed by: Mumtaz Patiño MD  Consult ordered by: Angelica Dugan MD          Review of Systems--per HPI    Historical Information   Past Medical History:   Diagnosis Date    Gallstones     Internal hordeolum of right eye 2017    Migraines      History reviewed. No pertinent surgical history.   OB History    Para Term  AB Living   0 0 0 0 0 0   SAB IAB Ectopic Multiple Live Births   0 0 0 0 0     Family History   Problem Relation Age of Onset    No Known Problems Mother     No Known Problems Father      Social History   Social History     Substance and Sexual Activity   Alcohol Use Never Social History     Substance and Sexual Activity   Drug Use Never     E-Cigarette/Vaping    E-Cigarette Use Never User      E-Cigarette/Vaping Substances    Nicotine No     THC No     CBD No     Flavoring No     Other No     Unknown No      Social History     Tobacco Use   Smoking Status Never    Passive exposure: Never   Smokeless Tobacco Never       Meds/Allergies   all current active meds have been reviewed    No Known Allergies    Objective   Vitals: Blood pressure 104/53, pulse 88, temperature 98.7 °F (37.1 °C), temperature source Axillary, resp. rate 16, height 5' 1" (1.549 m), weight 60.8 kg (134 lb), last menstrual period 09/23/2023, SpO2 98 %, not currently breastfeeding. Body mass index is 25.32 kg/m². Intake/Output Summary (Last 24 hours) at 11/29/2023 1452  Last data filed at 11/29/2023 0415  Gross per 24 hour   Intake 700 ml   Output --   Net 700 ml       Invasive Devices       Peripheral Intravenous Line  Duration             Peripheral IV 11/28/23 Left Antecubital <1 day    Peripheral IV 11/29/23 Proximal;Right;Ventral (anterior) Forearm <1 day                    Physical Exam  Constitutional:       Appearance: Normal appearance. HENT:      Head: Normocephalic and atraumatic. Eyes:      Extraocular Movements: Extraocular movements intact. Conjunctiva/sclera: Conjunctivae normal.   Pulmonary:      Effort: Pulmonary effort is normal.   Abdominal:      General: There is no distension. Palpations: Abdomen is soft. Tenderness: There is no abdominal tenderness. There is no guarding. Genitourinary:     Comments: Vulva: normal, no lesions  Vagina: normal, no lesions or ttp  Urethra: normal, no lesions, masses or ttp  Bladder: normal, no masses or ttp  Cervix: small clot at os, no active bleeding, not significantly dilated, no CMT  Uterus: c/w 8weeks, non ttp  Adnexa: no masses or ttp    Musculoskeletal:         General: Normal range of motion.       Cervical back: Normal range of motion. Skin:     General: Skin is warm and dry. Neurological:      General: No focal deficit present. Mental Status: She is alert. Psychiatric:         Mood and Affect: Mood normal.         Behavior: Behavior normal.         Thought Content: Thought content normal.         Lab Results: I have personally reviewed pertinent reports. and I have personally reviewed pertinent films in PACS  Imaging Studies: I have personally reviewed pertinent reports. and I have personally reviewed pertinent films in PACS  EKG, Pathology, and Other Studies: I have personally reviewed pertinent reports. Code Status: Level 1 - Full Code  Advance Directive and Living Will:      Power of :    POLST:      Counseling / Coordination of Care  Total floor / unit time spent today 90 minutes. Greater than 50% of total time was spent with the patient and / or family counseling and / or coordination of care.  A description of the counseling / coordination of care: examining patient, reviewing results and images, discussing options with patient and her partner as well as with medical team.

## 2023-11-29 NOTE — ASSESSMENT & PLAN NOTE
Symptomatic anemia with lightheadedness and tachycardia 100-110s in ED likely due to active vaginal bleeding. Reports experiencing dizziness, blurred vision. Denies SOB, chest pain, palpitations, or loss of consciousness.     ED 11/28: Hgb was 10.3 and downtrending, patient received cytotec and venofer  Admission 11/29: Hgb 6.2, received 2 units PRBCs and IV fluids  Remained stable after transfusions  F/U PCP outpatient with repeat CBC 1 week after discharge

## 2023-11-29 NOTE — PLAN OF CARE
Problem: PAIN - ADULT  Goal: Verbalizes/displays adequate comfort level or baseline comfort level  Description: Interventions:  - Encourage patient to monitor pain and request assistance  - Assess pain using appropriate pain scale  - Administer analgesics based on type and severity of pain and evaluate response  - Implement non-pharmacological measures as appropriate and evaluate response  - Consider cultural and social influences on pain and pain management  - Notify physician/advanced practitioner if interventions unsuccessful or patient reports new pain  Outcome: Progressing     Problem: INFECTION - ADULT  Goal: Absence or prevention of progression during hospitalization  Description: INTERVENTIONS:  - Assess and monitor for signs and symptoms of infection  - Monitor lab/diagnostic results  - Monitor all insertion sites, i.e. indwelling lines, tubes, and drains  - Monitor endotracheal if appropriate and nasal secretions for changes in amount and color  - [place] appropriate cooling/warming therapies per order  - Administer medications as ordered  - Instruct and encourage patient and family to use good hand hygiene technique  - Identify and instruct in appropriate isolation precautions for identified infection/condition  Outcome: Progressing     Problem: Knowledge Deficit  Goal: Patient/family/caregiver demonstrates understanding of disease process, treatment plan, medications, and discharge instructions  Description: Complete learning assessment and assess knowledge base.   Interventions:  - Provide teaching at level of understanding  - Provide teaching via preferred learning methods  Outcome: Progressing

## 2023-11-29 NOTE — ASSESSMENT & PLAN NOTE
Reports nausea in setting of unintentional pregnancy with active bleeding due to incomplete . Denies any episodes of vomiting. Tolerating food well.   Diet regular  Zofran PRN

## 2023-11-29 NOTE — ASSESSMENT & PLAN NOTE
Active vaginal bleeding since last Wednesday in setting of incomplete . Gerber Lower 100-110s in ED. Reports experiencing dizziness, blurred vision, and tinnitus which resolved before presenting to the ED after eating food.  Denies SOB, chest pain, palpitations, or loss of consciousness.  - ED: Received cytotec and venofer  - Hgb downtrending  Repeat H/H  Consider PRBCs if Hgb <7, consent for blood products signed  Fall precautions

## 2023-11-29 NOTE — UTILIZATION REVIEW
Initial Clinical Review    Admission: Date/Time/Statement:   Admission Orders (From admission, onward)       Ordered        23  Place in Observation  Once                          Orders Placed This Encounter   Procedures    Place in Observation     Standing Status:   Standing     Number of Occurrences:   1     Order Specific Question:   Level of Care     Answer:   Med Surg [16]     ED Arrival Information       Expected   -    Arrival   2023 19:15    Acuity   Urgent              Means of arrival   Walk-In    Escorted by   Friend    Service   Hospitalist    Admission type   Emergency              Arrival complaint   anemia             Chief Complaint   Patient presents with    Vaginal Bleeding - Pregnant     Was here earlier dx with miscarriage. Went home and took a shower and almost passed out. States she feels worse now then earlier       Initial Presentation:   53-year-old female presents with vaginal bleeding, lightheadedness she almost passed out with nausea. She had a positive pregnancy test last menstrual cycle was about 5 weeks ago. She was seen in the ED more than 12 hours ago had evaluation with labs and ultrasound. Diagnosed with threatened miscarriage given Cytotec and Venofer after discussion with the obstetrics. Patient said she went home the bleeding increased and she experienced other symptoms. This is her first pregnancy G1, P0. No abdominal pain. Hx migraines, gallstones. Presents tachycardic, s/s as above. Admission US showing findings are suggestive of a spontaneous  in progress.  Placed in observation status for incomplete     ED Triage Vitals   Temperature Pulse Respirations Blood Pressure SpO2   23   97.9 °F (36.6 °C) (!) 117 18 130/59 100 %      Temp Source Heart Rate Source Patient Position - Orthostatic VS BP Location FiO2 (%)   23 -- Oral Monitor Lying Left arm       Pain Score       11/28/23 1918       3          Wt Readings from Last 1 Encounters:   11/29/23 60.8 kg (134 lb)     Additional Vital Signs:   Date/Time Temp Pulse Resp BP MAP (mmHg) SpO2 O2 Device Patient Position - Orthostatic VS   11/29/23 0655 98.3 °F (36.8 °C) 81 16 103/59 -- -- -- --   11/29/23 0531 98.6 °F (37 °C) 86 16 100/55  -- -- -- --   BP: vital post transfusion at 11/29/23 0531   11/29/23 0415 98.2 °F (36.8 °C) 74 16 90/52 -- -- -- --   11/29/23 0345 98 °F (36.7 °C) 81 16 97/53 -- -- -- --   11/29/23 0331 98.2 °F (36.8 °C) 81 16 96/51 -- -- -- --   11/29/23 0319 -- 86 16 99/58 -- -- -- --   11/29/23 0308 98.4 °F (36.9 °C) 86 16 99/58 -- -- -- --   11/29/23 0230 98.4 °F (36.9 °C) 76 16 99/56 -- -- -- --   11/29/23 0221 -- -- -- 101/57 -- -- -- --   11/29/23 0215 98.2 °F (36.8 °C) 84 -- 99/56 -- -- -- --   11/29/23 0145 98.8 °F (37.1 °C) 88 16 92/52 -- -- -- --   11/29/23 0137 99.1 °F (37.3 °C) 85 16 101/56 74 -- -- Lying   11/29/23 0100 99.3 °F (37.4 °C) 95 18 100/57 74 -- -- Lying   11/29/23 0017 -- 96 -- 94/56 68 98 % None (Room air) --   11/29/23 0010 -- 98 -- 95/54 69 98 % -- --   11/28/23 2330 -- 94 18 98/54 72 98 % None (Room air) --   11/28/23 2300 -- 96 18 116/58 79 98 % None (Room air) Lying   11/28/23 2230 -- 110 Abnormal  18 108/59 76 98 % -- Lying   11/28/23 2200 -- 112 Abnormal  18 115/55 77 99 % None (Room air) Lying   11/28/23 2130 -- 108 Abnormal  16 109/61 76 100 % None (Room air) Lying   11/28/23 2030 -- 104 16 103/61 78 98 % None (Room air) Sitting   11/28/23 2015 -- 108 Abnormal  16 104/67 80 100 % None (Room air) Lying   11/28/23 2000 -- 100 16 100/57 72 97 % -- Lying   11/28/23 1919 -- 117 Abnormal  -- 130/59 -- 100 % -- --   11/28/23 1918 97.9 °F (36.6 °C) -- 18 -- -- -- -- --     Pertinent Labs/Diagnostic Test Results:   US OB Follow up with Transvaginal   Final Result by Abraham Lemus MD (11/28 2136)      Cystic structure likely representing an intrauterine gestation is now more low-lying compared to the earlier ultrasound. Mobile echogenic material in the endometrial cavity and in the vagina, most likely representing blood products. These findings are suggestive of a spontaneous  in progress.             Results from last 7 days   Lab Units 2349 23  0014 23  0824 23  0426   WBC Thousand/uL 11.74*  --  12.62*  --  12.34*   HEMOGLOBIN g/dL 10.9* 6.2* 8.0* 9.6* 10.3*   HEMATOCRIT % 31.8* 18.4* 23.5*  --  31.0*   PLATELETS Thousands/uL 168  --  205  --  234   NEUTROS ABS Thousands/µL 6.74  --  9.47*  --   --          Results from last 7 days   Lab Units 2349 23   SODIUM mmol/L 137 134*   POTASSIUM mmol/L 3.6 3.5   CHLORIDE mmol/L 111* 105   CO2 mmol/L 23 22   ANION GAP mmol/L 3 7   BUN mg/dL 6 11   CREATININE mg/dL 0.47* 0.66   EGFR ml/min/1.73sq m 141 126   CALCIUM mg/dL 7.8* 8.4   MAGNESIUM mg/dL 1.9  --              Results from last 7 days   Lab Units 2349 23   GLUCOSE RANDOM mg/dL 90 122             No results found for: "BETA-HYDROXYBUTYRATE"                                                               Results from last 7 days   Lab Units 2315   UNIT PRODUCT CODE  [id number]  [id number]   UNIT NUMBER  [id number]  [id number]   UNITABO  O  O   UNITRH  POS  POS   CROSSMATCH  Compatible  Compatible   UNIT DISPENSE STATUS  Presumed Trans  Presumed Trans   UNIT PRODUCT VOL mL 350  350                         Results from last 7 days   Lab Units 23   CLARITY UA  Slightly Cloudy   COLOR UA  Pink   SPEC GRAV UA  1.025   PH UA  6.0   GLUCOSE UA mg/dl Negative   KETONES UA mg/dl Negative   BLOOD UA  Large*   PROTEIN UA mg/dl 30 (1+)*   NITRITE UA  Negative   BILIRUBIN UA  Negative   UROBILINOGEN UA E.U./dl 0.2   LEUKOCYTES UA  Negative   WBC UA /hpf 2-4   RBC UA /hpf Innumerable*   BACTERIA UA /hpf Occasional   EPITHELIAL CELLS WET PREP /hpf Occasional   MUCUS THREADS  Occasional*                                                   ED Treatment:   Medication Administration from 2023 1914 to 2023 0040         Date/Time Order Dose Route Action     2023 EST sodium chloride 0.9 % bolus 1,000 mL 1,000 mL Intravenous New Bag     2023 2244 EST sodium chloride 0.9 % infusion 100 mL/hr Intravenous New Bag          Past Medical History:   Diagnosis Date    Gallstones     Internal hordeolum of right eye 2017    Migraines      Present on Admission:   Incomplete    Symptomatic anemia   Nausea      Admitting Diagnosis: Incomplete  [O03.4]  Vaginal bleeding [N93.9]  Spontaneous  [O03.9]  Age/Sex: 26 y.o. female  Admission Orders:  Scd/foot pumps  Transfuse 2uprbc's    Scheduled Medications:    Continuous IV Infusions:  sodium chloride, 100 mL/hr, Intravenous, Continuous    PRN Meds:  ondansetron, 4 mg, Intravenous, Q4H PRN    Network Utilization Review Department  ATTENTION: Please call with any questions or concerns to [phone] and carefully listen to the prompts so that you are directed to the right person. All voicemails are confidential.   For Discharge needs, contact Care Management DC Support Team at [phone] opt. 2  Send all requests for admission clinical reviews, approved or denied determinations and any other requests to dedicated fax number below belonging to the campus where the patient is receiving treatment.  List of dedicated fax numbers for the Facilities:  [place] DENIALS (Administrative/Medical Necessity) [phone]   DISCHARGE SUPPORT TEAM (NETWORK) [id number] [facility]gins (Maternity/NICU/Pediatrics) [address] [place] [address] [phone] [address] [address] [facility] [address] [phone]

## 2023-11-29 NOTE — ASSESSMENT & PLAN NOTE
Pt with PMHx of irregular periods (LMP 23) presents with lightheadedness, nausea and active vaginal bleeding since last Wednesday. Sexually active with no contraceptive use & negative home pregnancy test 2 weeks ago. Unintentional pregnancy confirmed with Hcg and US suggestive of spontaneous  in progress. Received cytotec and venofer during initial ED visit this morning.    - OB US initial (): Small gestational sac without a fetal pole or embryo. This is likely due to the early gestational age. Possible blood products within the endometrial canal.  - OB US repeat (): Cystic structure likely representing an intrauterine gestation is now more low-lying compared to the earlier ultrasound. Mobile echogenic material in the endometrial cavity and in the vagina, most likely representing blood products. These findings are suggestive of a spontaneous  in progress.  - OB US (): Heterogeneous material and fluid within the endometrial cavity with open cervix and no definite gestational sac, findings presumably reflecting spontaneous  in progress with evolution of blood products. Retained products of conception are not excluded, though no vascularity is seen.     S/p cytotec x2  ABO/RH positive, rhogam not needed  Tylenol for pain  Encourage ambulation  OBGYN consulted:  Educated patient on potential increased bleeding and cramping with cytotec; patient prefers Cytotec with outpatient f/u vs D&E  Continue expectant management   on options for contraception upon discharge  F/U OB outpatient 1-2 weeks after discharge with repeat Hcg labs

## 2023-11-29 NOTE — ED PROVIDER NOTES
History  Chief Complaint   Patient presents with    Vaginal Bleeding - Pregnant     Was here earlier dx with miscarriage. Went home and took a shower and almost passed out. States she feels worse now then earlier     65-year-old female presents with vaginal bleeding, lightheadedness she almost passed out with nausea. She had a positive pregnancy test last menstrual cycle was about 5 weeks ago. She was seen in the ED more than 12 hours ago had evaluation with labs and ultrasound. Diagnosed with threatened miscarriage given Cytotec and Venofer after discussion with the obstetrics. Patient said she went home the bleeding increased and she experienced other symptoms. This is her first pregnancy G1, P0. No abdominal pain      History provided by:  Patient   used: No        Prior to Admission Medications   Prescriptions Last Dose Informant Patient Reported? Taking?   famotidine (PEPCID) 20 mg tablet   No No   Sig: Take 1 tablet (20 mg total) by mouth 2 (two) times a day   Patient not taking: Reported on 7/28/2023   lidocaine (Lidoderm) 5 %   No No   Sig: Apply 1 patch topically over 12 hours daily as needed (back pain) Remove & Discard patch within 12 hours or as directed by MD   Patient not taking: Reported on 11/12/2023   ondansetron (Zofran ODT) 4 mg disintegrating tablet   No No   Sig: Take 1 tablet (4 mg total) by mouth every 6 (six) hours as needed for nausea or vomiting   Patient not taking: Reported on 7/28/2023   sodium chloride 1 g tablet   No No   Sig: Take 1 tablet (1 g total) by mouth 2 (two) times a day   Patient not taking: Reported on 7/28/2023      Facility-Administered Medications: None       Past Medical History:   Diagnosis Date    Gallstones     Internal hordeolum of right eye 8/8/2017    Migraines        History reviewed. No pertinent surgical history.     Family History   Problem Relation Age of Onset    No Known Problems Mother     No Known Problems Father      I have reviewed and agree with the history as documented. E-Cigarette/Vaping    E-Cigarette Use Never User      E-Cigarette/Vaping Substances    Nicotine No     THC No     CBD No     Flavoring No     Other No     Unknown No      Social History     Tobacco Use    Smoking status: Never    Smokeless tobacco: Never   Vaping Use    Vaping Use: Never used   Substance Use Topics    Alcohol use: No    Drug use: No       Review of Systems   Constitutional: Negative. HENT: Negative. Eyes: Negative. Respiratory: Negative. Cardiovascular: Negative. Gastrointestinal:  Positive for nausea. Endocrine: Negative. Genitourinary:  Positive for vaginal bleeding. Musculoskeletal: Negative. Skin: Negative. Allergic/Immunologic: Negative. Neurological:  Positive for light-headedness. Hematological: Negative. Psychiatric/Behavioral: Negative. All other systems reviewed and are negative. Physical Exam  Physical Exam  Vitals and nursing note reviewed. Constitutional:       Appearance: Normal appearance. HENT:      Head: Normocephalic and atraumatic. Nose: Nose normal.      Mouth/Throat:      Mouth: Mucous membranes are moist.   Eyes:      Extraocular Movements: Extraocular movements intact. Pupils: Pupils are equal, round, and reactive to light. Cardiovascular:      Rate and Rhythm: Normal rate and regular rhythm. Pulmonary:      Effort: Pulmonary effort is normal.      Breath sounds: Normal breath sounds. Abdominal:      General: Abdomen is flat. Bowel sounds are normal. There is no distension. Palpations: Abdomen is soft. There is no mass. Tenderness: There is no abdominal tenderness. There is no right CVA tenderness, left CVA tenderness, guarding or rebound. Hernia: No hernia is present. Musculoskeletal:         General: Normal range of motion. Cervical back: Normal range of motion and neck supple. Skin:     General: Skin is warm.       Capillary Refill: Capillary refill takes less than 2 seconds. Neurological:      General: No focal deficit present. Mental Status: She is alert and oriented to person, place, and time. Mental status is at baseline. Psychiatric:         Mood and Affect: Mood normal.         Thought Content:  Thought content normal.         Vital Signs  ED Triage Vitals   Temperature Pulse Respirations Blood Pressure SpO2   11/28/23 1918 11/28/23 1919 11/28/23 1918 11/28/23 1919 11/28/23 1919   97.9 °F (36.6 °C) (!) 117 18 130/59 100 %      Temp Source Heart Rate Source Patient Position - Orthostatic VS BP Location FiO2 (%)   11/29/23 0100 11/28/23 2030 11/28/23 2000 11/28/23 2030 --   Oral Monitor Lying Left arm       Pain Score       11/28/23 1918       3           Vitals:    11/28/23 2330 11/29/23 0010 11/29/23 0017 11/29/23 0100   BP: 98/54 95/54 94/56 100/57   Pulse: 94 98 96 95   Patient Position - Orthostatic VS:    Lying         Visual Acuity      ED Medications  Medications   sodium chloride 0.9 % infusion (100 mL/hr Intravenous New Bag 11/28/23 2244)   ondansetron (ZOFRAN) injection 4 mg (has no administration in time range)   sodium chloride 0.9 % bolus 1,000 mL (0 mL Intravenous Stopped 11/28/23 2208)       Diagnostic Studies  Results Reviewed       Procedure Component Value Units Date/Time    Hemoglobin and hematocrit, blood [[id number]]  (Abnormal) Collected: 11/29/23 0014    Lab Status: Final result Specimen: Blood from Arm, Left Updated: 11/29/23 0037     Hemoglobin 6.2 g/dL      Hematocrit 18.4 %     Urine Microscopic [[id number]]  (Abnormal) Collected: 11/28/23 1957    Lab Status: Final result Specimen: Urine, Clean Catch Updated: 11/28/23 2038     RBC, UA Innumerable /hpf      WBC, UA 2-4 /hpf      Epithelial Cells Occasional /hpf      Bacteria, UA Occasional /hpf      MUCUS THREADS Occasional    Basic metabolic panel [[id number]]  (Abnormal) Collected: 11/28/23 1944    Lab Status: Final result Specimen: Blood from Arm, Left Updated: 11/28/23 2020     Sodium 134 mmol/L      Potassium 3.5 mmol/L      Chloride 105 mmol/L      CO2 22 mmol/L      ANION GAP 7 mmol/L      BUN 11 mg/dL      Creatinine 0.66 mg/dL      Glucose 122 mg/dL      Calcium 8.4 mg/dL      eGFR 126 ml/min/1.73sq m     Narrative:      [facility] guidelines for Chronic Kidney Disease (CKD):     Stage 1 with normal or high GFR (GFR > 90 mL/min/1.73 square meters)    Stage 2 Mild CKD (GFR = 60-89 mL/min/1.73 square meters)    Stage 3A Moderate CKD (GFR = 45-59 mL/min/1.73 square meters)    Stage 3B Moderate CKD (GFR = 30-44 mL/min/1.73 square meters)    Stage 4 Severe CKD (GFR = 15-29 mL/min/1.73 square meters)    Stage 5 End Stage CKD (GFR <15 mL/min/1.73 square meters)  Note: GFR calculation is accurate only with a steady state creatinine    UA (URINE) with reflex to Scope [[id number]]  (Abnormal) Collected: 11/28/23 1957    Lab Status: Final result Specimen: Urine, Clean Catch Updated: 11/28/23 2011     Color, UA Pink     Clarity, UA Slightly Cloudy     Specific Gravity, UA 1.025     pH, UA 6.0     Leukocytes, UA Negative     Nitrite, UA Negative     Protein, UA 30 (1+) mg/dl      Glucose, UA Negative mg/dl      Ketones, UA Negative mg/dl      Urobilinogen, UA 0.2 E.U./dl      Bilirubin, UA Negative     Occult Blood, UA Large    CBC and differential [[id number]]  (Abnormal) Collected: 11/28/23 1944    Lab Status: Final result Specimen: Blood from Arm, Left Updated: 11/28/23 1951     WBC 12.62 Thousand/uL      RBC 2.87 Million/uL      Hemoglobin 8.0 g/dL      Hematocrit 23.5 %      MCV 82 fL      MCH 27.9 pg      MCHC 34.0 g/dL      RDW 14.0 %      MPV 9.6 fL      Platelets 601 Thousands/uL      nRBC 0 /100 WBCs      Neutrophils Relative 74 %      Immat GRANS % 1 %      Lymphocytes Relative 19 %      Monocytes Relative 6 %      Eosinophils Relative 0 %      Basophils Relative 0 %      Neutrophils Absolute 9.47 Thousands/µL      Immature Grans Absolute 0.10 Thousand/uL      Lymphocytes Absolute 2.33 Thousands/µL      Monocytes Absolute 0.69 Thousand/µL      Eosinophils Absolute 0.01 Thousand/µL      Basophils Absolute 0.02 Thousands/µL                    US OB Follow up with Transvaginal   Final Result by Tasneem Juan MD ([id number])      Cystic structure likely representing an intrauterine gestation is now more low-lying compared to the earlier ultrasound. Mobile echogenic material in the endometrial cavity and in the vagina, most likely representing blood products. These findings are suggestive of a spontaneous  in progress. The study was marked in [facility] for immediate notification. Workstation performed: [id number]                    Procedures  Procedures         ED Course                               SBIRT 22yo+      Flowsheet Row Most Recent Value   Initial Alcohol Screen: US AUDIT-C     1. How often do you have a drink containing alcohol? 0 Filed at: 2023   2. How many drinks containing alcohol do you have on a typical day you are drinking? 0 Filed at: 2023   3a. Male UNDER 65: How often do you have five or more drinks on one occasion? 0 Filed at: 2023   3b. FEMALE Any Age, or MALE 65+: How often do you have 4 or more drinks on one occassion? 0 Filed at: 2023   Audit-C Score 0 Filed at: 2023   LUDMILA: How many times in the past year have you. .. Used an illegal drug or used a prescription medication for non-medical reasons? Never Filed at: 2023                      Medical Decision Making  Patient evaluated the ED with labs her hemoglobin dropped from original 10g/dl seen in the ED 12 hours ago to 8 g/dL now. She was tachycardic I offered transfusing blood the patient hesitated did not want blood at this time wanted to wait for the next blood count , wanted to think about it.   In the meanwhile I spoke to the obstetrics Dr. Frank Patricia discussed the case she recommended perhaps repeating the ultrasound to see where she is as far as her miscarriage. Ultrasound results reviewed. Patient says her bleeding is slowly steadied. She remained hemodynamically stable. Did not proceed with the transfusion but decided on admission to the hospital for observation with repeat H&H if needed she can then get the blood and then any emergency OB can be called back. Admitted To the [facility] for further evaluation management    While patient was in the ED after admission, patient's hemoglobin repeat came back at 6.2 g/dL patient now will be receiving PRBC transfusion consent had already been obtained in the ED. I did [place] text the obstetrics Dr. Simran Michelle discussed the drop in hemoglobin she remained hemodynamically stable with her systolic blood pressure in the mid 90s without any symptoms she did pass to 3 large clots during the process. Dr. Simran Michelle agreed with continuing to monitor. Resident is aware as well. Problems Addressed:  Spontaneous : acute illness or injury  Vaginal bleeding: acute illness or injury    Amount and/or Complexity of Data Reviewed  External Data Reviewed: notes. Labs: ordered. Decision-making details documented in ED Course. Radiology: ordered. Decision-making details documented in ED Course. Risk  Decision regarding hospitalization.              Disposition  Final diagnoses:   Vaginal bleeding   Spontaneous      Time reflects when diagnosis was documented in both MDM as applicable and the Disposition within this note       Time User Action Codes Description Comment    2023  9:55 PM AnepuChris Add [N93.9] Vaginal bleeding     2023  9:55 PM Adele Sood Add [O03.9] Spontaneous      2023 10:00 PM Candice Manzano Add [O03.4] Incomplete            ED Disposition       ED Disposition   Admit    Condition   Stable    Date/Time    067    Comment                   Follow-up Information    None         Current Discharge Medication List        CONTINUE these medications which have NOT CHANGED    Details   famotidine (PEPCID) 20 mg tablet Take 1 tablet (20 mg total) by mouth 2 (two) times a day  Qty: 30 tablet, Refills: 0    Associated Diagnoses: Abdominal pain      lidocaine (Lidoderm) 5 % Apply 1 patch topically over 12 hours daily as needed (back pain) Remove & Discard patch within 12 hours or as directed by MD  Qty: 15 patch, Refills: 0    Associated Diagnoses: Cervicalgia      ondansetron (Zofran ODT) 4 mg disintegrating tablet Take 1 tablet (4 mg total) by mouth every 6 (six) hours as needed for nausea or vomiting  Qty: 10 tablet, Refills: 0    Associated Diagnoses: Abdominal pain      sodium chloride 1 g tablet Take 1 tablet (1 g total) by mouth 2 (two) times a day  Qty: 60 tablet, Refills: 0    Associated Diagnoses: Hypotension, unspecified hypotension type             No discharge procedures on file.     PDMP Review       None            ED Provider  Electronically Signed by             Rosario Haynes DO  11/29/23 0034       Adele Sood DO  11/29/23 0103       Adele Sood,   11/29/23 0106

## 2023-11-29 NOTE — ASSESSMENT & PLAN NOTE
Reports nausea in setting of incomplete . Denies any episodes of vomiting. Tolerating food well.   Diet regular  Zofran PRN

## 2023-11-29 NOTE — ED NOTES
3N RN Nohemy Block made aware of critical hgb as well as Dr Lauro Dominguez.       Darrian Webb RN  11/29/23 6318

## 2023-11-29 NOTE — PROGRESS NOTES
Daily Progress Note - [facility]'s [address] [place] 26 y.o[place] female MRN: [id number]  Unit/Bed#: [address] [place] [address] Encounter: [id number]  Admitting Physician: Penny Berumen MD  PCP: Joe Mae MD  Date of Admission:  2023  7:20 PM    Summary:     IVFs: sodium chloride, Last Rate: 100 mL/hr (23 0818)    Diet: Diet NPO; Sips with meds  VTE:  Pharmacologic VTE Prophylaxis contraindicated due to active bleed  . Mechanical prophylaxis in place. Patient Centered Rounds: I have performed bedside rounds with nursing staff today. Specialists/Other Care Team Provider: OB/GYN    LOS: 0 day(s)  Status: Observation   Disposition: The patient will continue to require additional inpatient hospital stay due to symptomatic ABLA  Code Status: Level 1 - Full Code      Assessment and Plan    * Incomplete   Assessment & Plan  Pt with PMHx of irregular periods (LMP 23) presents with lightheadedness, nausea and active vaginal bleeding since last Wednesday. Sexually active with no contraceptive use & negative home pregnancy test 2 weeks ago. Unintentional pregnancy confirmed with Hcg and US suggestive of spontaneous  in progress. Received cytotec and venofer during initial ED visit this morning.    - OB US initial: Small gestational sac without a fetal pole or embryo. This is likely due to the early gestational age. Possible blood products within the endometrial canal.  - OB US repeat: Cystic structure likely representing an intrauterine gestation is now more low-lying compared to the earlier ultrasound. Mobile echogenic material in the endometrial cavity and in the vagina, most likely representing blood products. These findings are suggestive of a spontaneous  in progress.     ABO/RH positive, rhogam not needed  Tylenol for pain  Encourage ambulation  Starting IV NS @100mL/hr  Consider repeat cytotec if bleeding worsens without passing retained products of conception   on options for contraception upon discharge  OB/GYN consulted    ABLA (acute blood loss anemia)  Assessment & Plan  Symptomatic anemia with lightheadedness and tachycardia 100-110s in ED likely due to active vaginal bleeding. Reports experiencing dizziness, blurred vision. Denies SOB, chest pain, palpitations, or loss of consciousness. ED : Hgb was 10.3 and downtrending, patient received cytotec and venofer  Admission : Hgb 6.2, received 2 units PRBCs    Monitor H/H  Monitor Vital signs  Transfuse if Hgb <7, consent for blood products signed  Fall precautions    Nausea  Assessment & Plan  Reports nausea in setting of unintentional pregnancy with active bleeding due to incomplete . Denies any episodes of vomiting. Tolerating food well. Diet regular  Zofran PRN          Subjective:   Patient seen and evaluated at bedside, she was calm and in no acute distress. She reports feeling "weak "but denies any dizziness, lightheadedness, SOB, chest pain. She is still actively bleeding however it has gotten lighter and clots are smaller. Objective     Objective:   Vitals:   Temp (24hrs), Av.5 °F (36.9 °C), Min:97.9 °F (36.6 °C), Max:99.3 °F (37.4 °C)    Temp:  [97.9 °F (36.6 °C)-99.3 °F (37.4 °C)] 98.7 °F (37.1 °C)  HR:  [] 88  Resp:  [16-18] 16  BP: ()/(51-67) 104/53  SpO2:  [97 %-100 %] 98 %  Body mass index is 25.32 kg/m². Input and Output Summary (last 24 hours): Intake/Output Summary (Last 24 hours) at 2023 1250  Last data filed at 2023 0415  Gross per 24 hour   Intake 700 ml   Output --   Net 700 ml       Physical Exam:   Physical Exam  Vitals reviewed. Constitutional:       General: She is not in acute distress. Comments: Tired appearing   Cardiovascular:      Rate and Rhythm: Normal rate and regular rhythm. Heart sounds: Normal heart sounds.    Pulmonary:      Effort: Pulmonary effort is normal. No respiratory distress. Breath sounds: Normal breath sounds. Abdominal:      General: Bowel sounds are normal.      Palpations: Abdomen is soft. Tenderness: There is no abdominal tenderness. Musculoskeletal:      Right lower leg: No edema. Left lower leg: No edema. Skin:     Capillary Refill: Capillary refill takes 2 to 3 seconds. Neurological:      General: No focal deficit present. Mental Status: She is alert and oriented to person, place, and time. Psychiatric:         Mood and Affect: Mood normal.           Additional Data:     Labs:  Results from last 7 days   Lab Units 11/29/23  0649   WBC Thousand/uL 11.74*   HEMOGLOBIN g/dL 10.9*   HEMATOCRIT % 31.8*   PLATELETS Thousands/uL 168   NEUTROS PCT % 57   LYMPHS PCT % 34   MONOS PCT % 7   EOS PCT % 1     Results from last 7 days   Lab Units 11/29/23  0649   POTASSIUM mmol/L 3.6   CHLORIDE mmol/L 111*   CO2 mmol/L 23   BUN mg/dL 6   CREATININE mg/dL 0.47*   CALCIUM mg/dL 7.8*                   * I Have Reviewed All Lab Data Listed Above. * Additional Pertinent Lab Tests Reviewed: [facility] Admission Reviewed    Imaging:    Imaging Reports Reviewed Today Include: OB U/S  Imaging Personally Reviewed by Myself Includes:  OB U/S    Recent Cultures (last 7 days):           Last 24 Hours Medication List:   Current Facility-Administered Medications   Medication Dose Route Frequency Provider Last Rate    acetaminophen  650 mg Oral Q6H PRN Channing Essex, MD      ondansetron  4 mg Intravenous Q4H PRN Zainab Esquivel MD      sodium chloride  100 mL/hr Intravenous Continuous Zainab Esquivel  mL/hr (11/29/23 0818)               Patient Information Sharing:  Luda Yap does not wish inpatient team to notify their loved ones of their condition and current plan. Time Spent for Care: 45 minutes. More than 50% of total time spent on counseling and coordination of care as described above.     ** Please Note: Dictation voice to text software may have been used in the creation of this document.  610 Norm Mcintyre MD  11/29/23  12:50 PM

## 2023-11-30 VITALS
BODY MASS INDEX: 25.3 KG/M2 | HEART RATE: 86 BPM | OXYGEN SATURATION: 99 % | RESPIRATION RATE: 17 BRPM | TEMPERATURE: 98.5 F | SYSTOLIC BLOOD PRESSURE: 110 MMHG | HEIGHT: 61 IN | WEIGHT: 134 LBS | DIASTOLIC BLOOD PRESSURE: 53 MMHG

## 2023-11-30 LAB
ALBUMIN SERPL BCP-MCNC: 3.3 G/DL (ref 3.5–5)
ALP SERPL-CCNC: 40 U/L (ref 34–104)
ALT SERPL W P-5'-P-CCNC: 26 U/L (ref 7–52)
ANION GAP SERPL CALCULATED.3IONS-SCNC: 3 MMOL/L
AST SERPL W P-5'-P-CCNC: 19 U/L (ref 13–39)
B-HCG SERPL-ACNC: 2218 MIU/ML (ref 0–5)
BASOPHILS # BLD AUTO: 0.03 THOUSANDS/ÂΜL (ref 0–0.1)
BASOPHILS NFR BLD AUTO: 0 % (ref 0–1)
BILIRUB SERPL-MCNC: 0.27 MG/DL (ref 0.2–1)
BUN SERPL-MCNC: 6 MG/DL (ref 5–25)
CALCIUM ALBUM COR SERPL-MCNC: 8.3 MG/DL (ref 8.3–10.1)
CALCIUM SERPL-MCNC: 7.7 MG/DL (ref 8.4–10.2)
CHLORIDE SERPL-SCNC: 111 MMOL/L (ref 96–108)
CO2 SERPL-SCNC: 21 MMOL/L (ref 21–32)
CREAT SERPL-MCNC: 0.51 MG/DL (ref 0.6–1.3)
EOSINOPHIL # BLD AUTO: 0.17 THOUSAND/ÂΜL (ref 0–0.61)
EOSINOPHIL NFR BLD AUTO: 2 % (ref 0–6)
ERYTHROCYTE [DISTWIDTH] IN BLOOD BY AUTOMATED COUNT: 14.5 % (ref 11.6–15.1)
GFR SERPL CREATININE-BSD FRML MDRD: 137 ML/MIN/1.73SQ M
GLUCOSE SERPL-MCNC: 84 MG/DL (ref 65–140)
HCT VFR BLD AUTO: 28.1 % (ref 34.8–46.1)
HGB BLD-MCNC: 9.3 G/DL (ref 11.5–15.4)
IMM GRANULOCYTES # BLD AUTO: 0.1 THOUSAND/UL (ref 0–0.2)
IMM GRANULOCYTES NFR BLD AUTO: 1 % (ref 0–2)
LYMPHOCYTES # BLD AUTO: 3.04 THOUSANDS/ÂΜL (ref 0.6–4.47)
LYMPHOCYTES NFR BLD AUTO: 35 % (ref 14–44)
MAGNESIUM SERPL-MCNC: 2.2 MG/DL (ref 1.9–2.7)
MCH RBC QN AUTO: 28.3 PG (ref 26.8–34.3)
MCHC RBC AUTO-ENTMCNC: 33.1 G/DL (ref 31.4–37.4)
MCV RBC AUTO: 85 FL (ref 82–98)
MONOCYTES # BLD AUTO: 0.64 THOUSAND/ÂΜL (ref 0.17–1.22)
MONOCYTES NFR BLD AUTO: 7 % (ref 4–12)
NEUTROPHILS # BLD AUTO: 4.64 THOUSANDS/ÂΜL (ref 1.85–7.62)
NEUTS SEG NFR BLD AUTO: 55 % (ref 43–75)
NRBC BLD AUTO-RTO: 0 /100 WBCS
PHOSPHATE SERPL-MCNC: 2.9 MG/DL (ref 2.7–4.5)
PLATELET # BLD AUTO: 164 THOUSANDS/UL (ref 149–390)
PMV BLD AUTO: 9.6 FL (ref 8.9–12.7)
POTASSIUM SERPL-SCNC: 3.7 MMOL/L (ref 3.5–5.3)
PROT SERPL-MCNC: 5.4 G/DL (ref 6.4–8.4)
RBC # BLD AUTO: 3.29 MILLION/UL (ref 3.81–5.12)
SODIUM SERPL-SCNC: 135 MMOL/L (ref 135–147)
WBC # BLD AUTO: 8.62 THOUSAND/UL (ref 4.31–10.16)

## 2023-11-30 PROCEDURE — 84702 CHORIONIC GONADOTROPIN TEST: CPT | Performed by: INTERNAL MEDICINE

## 2023-11-30 PROCEDURE — 83735 ASSAY OF MAGNESIUM: CPT | Performed by: INTERNAL MEDICINE

## 2023-11-30 PROCEDURE — 99239 HOSP IP/OBS DSCHRG MGMT >30: CPT | Performed by: INTERNAL MEDICINE

## 2023-11-30 PROCEDURE — 80053 COMPREHEN METABOLIC PANEL: CPT | Performed by: INTERNAL MEDICINE

## 2023-11-30 PROCEDURE — 84100 ASSAY OF PHOSPHORUS: CPT | Performed by: INTERNAL MEDICINE

## 2023-11-30 PROCEDURE — 85025 COMPLETE CBC W/AUTO DIFF WBC: CPT | Performed by: INTERNAL MEDICINE

## 2023-11-30 NOTE — DISCHARGE INSTR - AVS FIRST PAGE
Remember to follow-up with your OB/GYN in 1 to 2 weeks  Repeat CBC in 1 week  Return to the emergency department if you have fever, chills or if you are bleeding gets significantly heavier causing dizziness.

## 2023-11-30 NOTE — DISCHARGE INSTRUCTIONS
Miscarriage   WHAT YOU NEED TO KNOW:   A miscarriage is the loss of a fetus within the first 20 weeks of pregnancy. A miscarriage may also be called a spontaneous  or an early pregnancy loss. DISCHARGE INSTRUCTIONS:   Seek care immediately if:   You have foul-smelling drainage or pus coming from your vagina. You have heavy vaginal bleeding and soak 1 pad or more in an hour. You have severe abdominal pain. You feel like your heart is beating faster than normal.    You feel extremely weak or dizzy. Contact your healthcare provider if:   You have a fever greater than 100.4°F or chills. You have extreme sadness, grief, or feel unable to cope with what has happened. You have questions or concerns about your condition or care. Self-care:   Do not put anything in your vagina for 2 weeks or as directed. Do not use tampons, douche, or have sex. These actions can cause infection and pain. Use sanitary pads as needed. You may have light bleeding or spotting for 2 weeks. Do not take a bath or go swimming for 2 weeks or as directed. These actions may increase your risk for an infection. Take showers only. Rest as needed. Slowly start to do more each day. Return to your daily activities as directed. Talk to your healthcare provider about birth control. If you would like to prevent another pregnancy, ask your healthcare provider which type of birth control is best for you. Join a support group or therapy to help you cope. A miscarriage may be very difficult for you, your partner, and other members of your family. There is no right way to feel after a miscarriage. You may feel overwhelming grief or other emotions. It may be helpful to talk to a friend, family member, or counselor about your feelings. You may worry that you could have another miscarriage. Talk to your healthcare provider about your concerns.  Your provider may be able to help you reduce the risk for another miscarriage. Your provider may also help you find ways to cope with grief. For more information:   [employer]  [address]  Phone: [phone]  Phone: [phone]  Web Address: http://[employer]. org    [facility]  [address] , [address]  Web Address: Buyou  Follow up with your healthcare provider as directed: You may need to see your healthcare provider for blood tests or an ultrasound. Write down your questions so you remember to ask them during your visits. © Copyright [employer] 2023 Information is for End User's use only and may not be sold, redistributed or otherwise used for commercial purposes. The above information is an  only. It is not intended as medical advice for individual conditions or treatments. Talk to your doctor, nurse or pharmacist before following any medical regimen to see if it is safe and effective for you.

## 2023-11-30 NOTE — DISCHARGE SUMMARY
Discharge Summary - [address] [place] [address] Family Medicine Residency   Patient Information: Cesar Moran 26 y.o. female MRN: [id number]  Unit/Bed#: [address] [place] [address] Encounter: [id number]  Admitting Physician: Annie Bowman MD  Discharging Physician/Practitioner: Annie Bowman MD   PCP: Sarah Motta MD    Admission Date:   Admission Orders (From admission, onward)       Ordered        23 2155  Place in Observation  Once                          Discharge Date: 23      Reason for Admission:  Incomplete  [O03.4]  Vaginal bleeding [N93.9]  Spontaneous  [O03.9]     Discharge Diagnoses:      Principal Problem:    Incomplete   Active Problems:    ABLA (acute blood loss anemia)  Resolved Problems:    Nausea       Consultations During Hospital Stay:  ·       OB/GYN     Procedures Performed:   ·       None     Significant Findings / Test Results:  : Hgb 9.3, CMP WNL, beta-hCG 2218  : Hgb 6.2 > 10.9 > 9.9 > 10.0, beta-hCG 2589  - 2u PRBCs given  : Na 134, Hcg 1425, Hgb 10.3 > 9.6 > 8.0, beta-hCG 1425    Imaging:  - OB US initial: Small gestational sac without a fetal pole or embryo. This is likely due to the early gestational age. Continued follow-up is advised. Possible blood products within the endometrial canal.  - OB US repeat: Cystic structure likely representing an intrauterine gestation is now more low-lying compared to the earlier ultrasound. Mobile echogenic material in the endometrial cavity and in the vagina, most likely representing blood products. These findings are suggestive of a spontaneous  in progress. -OB US prior to discharge: Heterogeneous material and fluid within the endometrial cavity with open cervix and no definite gestational sac, findings presumably reflecting spontaneous  in progress with evolution of blood products. Retained products of conception are not   excluded, though no vascularity is seen. Incidental Findings:   ·       None, see findings as above      Test Results Pending at Discharge (will require follow up): ·       None     Outpatient Tests Requested:  ·       Beta-hCG 48 hours after discharge          Repeat CBC in 1 week       Outpatient follow-up Requested:  ·       OB/GYN in 1 to 2 weeks    Complications: None    Things to address at first visit after hospitalization   Have you followed up with OB/GYN? Has patient's vaginal bleeding stopped? Assess patient for signs of anemia, review discharge CBC. Has patient decided on contraception options? Hospital Course:      Lissy Kyle is a 26 y.o. female patient who originally presented to the hospital on 2023 due to vaginal bleeding as well as dizziness and lightheadedness. Patient was seen in the ER for her vaginal bleeding and an ultrasound showed the patient was in the process of an . She received 1 dose of Venofer and Cytotec and was discharged home with OB/GYN follow-up. Patient returned to the ER due to episode of lightheadedness where she almost fainted. Hemoglobin was 6.2 at this time therefore patient was admitted for acute blood loss anemia. Patient received IV fluids, 2 units of PRBCs and H/H was monitored. Her hemoglobin remained stable after transfusion and she no longer reported episodes of dizziness. Her vaginal bleeding was improving as well. OB/GYN was consulted, and the repeat ultrasound revealed progression of her . Beta hCGs were downtrending on discharge. After discussion with the patient, decision was made to continue with medical management patient wanted to try to avoid D&C. Another dose of Cytotec was administered and patient was discharged with repeat beta-hCG and follow-up with OB in 1 to 2 weeks.        * Incomplete   Assessment & Plan  Pt with PMHx of irregular periods (LMP 23) presents with lightheadedness, nausea and active vaginal bleeding since last Wednesday. Sexually active with no contraceptive use & negative home pregnancy test 2 weeks ago. Unintentional pregnancy confirmed with Hcg and US suggestive of spontaneous  in progress. Received cytotec and venofer during initial ED visit this morning.    - OB US initial (): Small gestational sac without a fetal pole or embryo. This is likely due to the early gestational age. Possible blood products within the endometrial canal.  - OB US repeat (): Cystic structure likely representing an intrauterine gestation is now more low-lying compared to the earlier ultrasound. Mobile echogenic material in the endometrial cavity and in the vagina, most likely representing blood products. These findings are suggestive of a spontaneous  in progress.  - OB US (): Heterogeneous material and fluid within the endometrial cavity with open cervix and no definite gestational sac, findings presumably reflecting spontaneous  in progress with evolution of blood products. Retained products of conception are not excluded, though no vascularity is seen. S/p cytotec x2  ABO/RH positive, rhogam not needed  Tylenol for pain  Encourage ambulation  OBGYN consulted:  Educated patient on potential increased bleeding and cramping with cytotec; patient prefers Cytotec with outpatient f/u vs D&E  Continue expectant management   on options for contraception upon discharge  F/U OB outpatient 1-2 weeks after discharge with repeat Hcg labs    ABLA (acute blood loss anemia)  Assessment & Plan  Symptomatic anemia with lightheadedness and tachycardia 100-110s in ED likely due to active vaginal bleeding. Reports experiencing dizziness, blurred vision. Denies SOB, chest pain, palpitations, or loss of consciousness.     ED : Hgb was 10.3 and downtrending, patient received cytotec and venofer  Admission : Hgb 6.2, received 2 units PRBCs and IV fluids  Remained stable after transfusions  F/U PCP outpatient with repeat CBC 1 week after discharge    Nausea-resolved as of 2023  Assessment & Plan  Reports nausea in setting of unintentional pregnancy with active bleeding due to incomplete . Denies any episodes of vomiting. Tolerating food well. Diet regular  Zofran PRN        Condition at Discharge: stable      Discharge Day Visit / Exam:      Vitals: Blood Pressure: 110/53 (23)  Pulse: 86 (23)  Temperature: 98.5 °F (36.9 °C) (23)  Temp Source: Oral (23)  Respirations: 17 (23)  Height: 5' 1" (154.9 cm) (23)  Weight - Scale: 60.8 kg (134 lb) (23)  SpO2: 99 % (23)    Exam:   Physical Exam  Vitals reviewed. Constitutional:       General: She is not in acute distress. Cardiovascular:      Rate and Rhythm: Normal rate and regular rhythm. Heart sounds: Normal heart sounds. Pulmonary:      Effort: Pulmonary effort is normal. No respiratory distress. Breath sounds: Normal breath sounds. Abdominal:      General: Bowel sounds are normal.      Palpations: Abdomen is soft. Tenderness: There is no abdominal tenderness. Musculoskeletal:      Right lower leg: No edema. Left lower leg: No edema. Neurological:      General: No focal deficit present. Mental Status: She is alert and oriented to person, place, and time. Psychiatric:         Mood and Affect: Mood normal.          Patient Information Sharing: Alvaro Radford does not wish inpatient team to notify their loved ones of their condition and current plan. Discharge instructions/Information to patient and family:   See after visit summary for information provided to patient and family. Discharge Medications:     Medication List      ASK your doctor about these medications     famotidine 20 mg tablet; Commonly known as: PEPCID; Take 1 tablet (20 mg   total) by mouth 2 (two) times a day   lidocaine 5 %;  Commonly known as: Lidoderm; Apply 1 patch topically over   12 hours daily as needed (back pain) Remove & Discard patch within 12   hours or as directed by MD   ondansetron 4 mg disintegrating tablet; Commonly known as: Zofran ODT; Take 1 tablet (4 mg total) by mouth every 6 (six) hours as needed for   nausea or vomiting   sodium chloride 1 g tablet; Take 1 tablet (1 g total) by mouth 2 (two)   times a day        Disposition:   Home     For Discharges to [facility]:   ·       Not Applicable to this Patient - Not Applicable to this Patient     Discharge Statement:  I spent 45 minutes discharging the patient. This time was spent on the day of discharge. I had direct contact with the patient on the day of discharge. Greater than 50% of the total time was spent examining patient, answering all patient questions, arranging and discussing plan of care with patient as well as directly providing post-discharge instructions. Additional time then spent on discharge activities.      ** Please Note: This note has been constructed using a voice recognition system **     Zuri Castillo MD   11/30/23  2:07 PM

## 2023-11-30 NOTE — DISCHARGE SUMMARY
Discharge Summary - [address] [place] [address] Family Medicine Residency     Patient Information: Brielle Olguin 26 y.o. female MRN: [id number]  Unit/Bed#: [address] [place] [address] Encounter: [id number]     Admitting Physician: Jr Hernandes MD  Discharging Physician/Practitioner: Jr Hernandes MD   PCP: Emi Herrera MD  Admission Date:   Admission Orders (From admission, onward)       Ordered        23 2155  Place in Observation  Once                          Discharge Date: 23      Reason for Admission: Incomplete  [O03.4]  Vaginal bleeding [N93.9]  Spontaneous  [O03.9]     Discharge Diagnoses:      Principal Problem:    Incomplete   Active Problems:    ABLA (acute blood loss anemia)  Resolved Problems:    Nausea       Consultations During Hospital Stay:  ·       OBGYN     Procedures Performed:   ·       none     Significant Findings / Test Results:   : Hgb 6.2 > 10.9 > 9.9 > 10.0  - 2u PRBCs given  : Na 134, Hcg 1425, Hgb 10.3 > 9.6 > 8.0  - OB US initial: Small gestational sac without a fetal pole or embryo. This is likely due to the early gestational age. Continued follow-up is advised. Possible blood products within the endometrial canal.  - OB US repeat: Cystic structure likely representing an intrauterine gestation is now more low-lying compared to the earlier ultrasound. Mobile echogenic material in the endometrial cavity and in the vagina, most likely representing blood products. These findings are suggestive of a spontaneous  in progress. Incidental Findings:   ·       none      Test Results Pending at Discharge (will require follow up):   ·       CBC, Hcg     Outpatient Tests Requested:  ·       none     Outpatient follow-up Requested:  ·       OB/GYN, PCP    Complications:  none    Things to address at first visit after hospitalization   Has bleeding subsided? Considerations for contraception? Any anemic symptoms?     Hospital Course: Ena Gonzalez is a 26 y.o. female patient with history of irregular periods who originally presented to the hospital on 2023 due to 6 day history of increased vaginal bleeding, nausea and lightheadedness. ED evaluation with labs and imaging confirmed that patient had an unintentional pregnancy with an incomplete . Patient received appropriate medical treatment for expectant management, but due to her progressive symptoms and labs showing worsening anemia in the setting of acute blood loss, she was admitted for observation and medical management. OB/GYN was consulted for coordinated care. Specifics of her treatments are detailed below. Patient is currently afebrile and vitally stable for discharge. With share decision making, plans for close outpatient follow-up has been established. * Incomplete   Assessment & Plan  Pt with PMHx of irregular periods (LMP 23) presents with lightheadedness, nausea and active vaginal bleeding since last Wednesday. Sexually active with no contraceptive use & negative home pregnancy test 2 weeks ago. Unintentional pregnancy confirmed with Hcg and US suggestive of spontaneous  in progress. Received cytotec and venofer during initial ED visit this morning.    - OB US initial (): Small gestational sac without a fetal pole or embryo. This is likely due to the early gestational age. Possible blood products within the endometrial canal.  - OB US repeat (): Cystic structure likely representing an intrauterine gestation is now more low-lying compared to the earlier ultrasound. Mobile echogenic material in the endometrial cavity and in the vagina, most likely representing blood products.  These findings are suggestive of a spontaneous  in progress.  - OB US (): Heterogeneous material and fluid within the endometrial cavity with open cervix and no definite gestational sac, findings presumably reflecting spontaneous  in progress with evolution of blood products. Retained products of conception are not excluded, though no vascularity is seen. ABO/RH positive, rhogam not needed  Tylenol for pain  Encourage ambulation  OBGYN consulted;  Educated patient on potential increased bleeding and cramping with cytotec; patient prefers Cytotec with outpatient f/u vs D&E  Ordered repeat Cytotec 800mcg PO & 1g IV MgSulfate  Continue expectant management   on options for contraception upon discharge  F/U OB outpatient 1-2 weeks after discharge with repeat Hcg labs    ABLA (acute blood loss anemia)  Assessment & Plan  Symptomatic anemia with lightheadedness and tachycardia 100-110s in ED likely due to active vaginal bleeding. Reports experiencing dizziness, blurred vision. Denies SOB, chest pain, palpitations, or loss of consciousness. ED : Hgb was 10.3 and downtrending, patient received cytotec and venofer  Admission : Hgb 6.2, received 2 units PRBCs  - Hgb currently stable 10.9 > 9.9 > 10.0  - Patient remains AFVSS & reports symptoms improving  F/U PCP outpatient with repeat anemia labs    Nausea-resolved as of 2023  Assessment & Plan  Reports nausea in setting of unintentional pregnancy with active bleeding due to incomplete . Denies any episodes of vomiting. Tolerating food well.   Diet regular  Zofran PRN        Condition at Discharge: stable      Discharge Day Visit / Exam:      Vitals: Blood Pressure: 116/63 (23)  Pulse: 79 (23)  Temperature: 98 °F (36.7 °C) (23)  Temp Source: Oral (23)  Respirations: 16 (23)  Height: 5' 1" (154.9 cm) (23)  Weight - Scale: 60.8 kg (134 lb) (23)  SpO2: 97 % (23)  Exam:   Physical Exam  ***     Discussion with Family: ***  Patient Information Sharing: With the consent of Johanne Mills, their loved ones (insert name(s) here***) were notified today by inpatient team of the patient’s condition and current plan. All questions answered. *** Lissy Kyle does not wish inpatient team to notify their loved ones of their condition and current plan. ***     Discharge instructions/Information to patient and family:   See after visit summary for information provided to patient and family. Discharge Medications:     Medication List      ASK your doctor about these medications     famotidine 20 mg tablet; Commonly known as: PEPCID; Take 1 tablet (20 mg   total) by mouth 2 (two) times a day   lidocaine 5 %; Commonly known as: Lidoderm; Apply 1 patch topically over   12 hours daily as needed (back pain) Remove & Discard patch within 12   hours or as directed by MD   ondansetron 4 mg disintegrating tablet; Commonly known as: Zofran ODT; Take 1 tablet (4 mg total) by mouth every 6 (six) hours as needed for   nausea or vomiting   sodium chloride 1 g tablet; Take 1 tablet (1 g total) by mouth 2 (two)   times a day        Disposition:   Home     For Discharges to [facility] SNF:   ·       Not Applicable to this Patient - Not Applicable to this Patient     Discharge Statement:  I spent *** minutes discharging the patient. This time was spent on the day of discharge. I had direct contact with the patient on the day of discharge. Greater than 50% of the total time was spent examining patient, answering all patient questions, arranging and discussing plan of care with patient as well as directly providing post-discharge instructions. Additional time then spent on discharge activities.      ** Please Note: This note has been constructed using a voice recognition system Anastasiia Spicer MD   11/30/23  12:10 AM

## 2023-12-01 ENCOUNTER — TRANSITIONAL CARE MANAGEMENT (OUTPATIENT)
Dept: FAMILY MEDICINE CLINIC | Facility: CLINIC | Age: 23
End: 2023-12-01

## 2023-12-01 NOTE — PLAN OF CARE
Problem: PAIN - ADULT  Goal: Verbalizes/displays adequate comfort level or baseline comfort level  Description: Interventions:  - Encourage patient to monitor pain and request assistance  - Assess pain using appropriate pain scale  - Administer analgesics based on type and severity of pain and evaluate response  - Implement non-pharmacological measures as appropriate and evaluate response  - Consider cultural and social influences on pain and pain management  - Notify physician/advanced practitioner if interventions unsuccessful or patient reports new pain  Outcome: Completed     Problem: INFECTION - ADULT  Goal: Absence or prevention of progression during hospitalization  Description: INTERVENTIONS:  - Assess and monitor for signs and symptoms of infection  - Monitor lab/diagnostic results  - Monitor all insertion sites, i.e. indwelling lines, tubes, and drains  - Monitor endotracheal if appropriate and nasal secretions for changes in amount and color  - [place] appropriate cooling/warming therapies per order  - Administer medications as ordered  - Instruct and encourage patient and family to use good hand hygiene technique  - Identify and instruct in appropriate isolation precautions for identified infection/condition  Outcome: Completed  Goal: Absence of fever/infection during neutropenic period  Description: INTERVENTIONS:  - Monitor WBC    Outcome: Completed     Problem: SAFETY ADULT  Goal: Patient will remain free of falls  Description: INTERVENTIONS:  - Educate patient/family on patient safety including physical limitations  - Instruct patient to call for assistance with activity   - Consult OT/PT to assist with strengthening/mobility   - Keep Call bell within reach  - Keep bed low and locked with side rails adjusted as appropriate  - Keep care items and personal belongings within reach  - Initiate and maintain comfort rounds  - Make Fall Risk Sign visible to staff  - Apply yellow socks and bracelet for high fall risk patients  - Consider moving patient to room near nurses station  Outcome: Completed  Goal: Maintain or return to baseline ADL function  Description: INTERVENTIONS:  -  Assess patient's ability to carry out ADLs; assess patient's baseline for ADL function and identify physical deficits which impact ability to perform ADLs (bathing, care of mouth/teeth, toileting, grooming, dressing, etc.)  - Assess/evaluate cause of self-care deficits   - Assess range of motion  - Assess patient's mobility; develop plan if impaired  - Assess patient's need for assistive devices and provide as appropriate  - Encourage maximum independence but intervene and supervise when necessary  - Involve family in performance of ADLs  - Assess for home care needs following discharge   - Consider OT consult to assist with ADL evaluation and planning for discharge  - Provide patient education as appropriate  Outcome: Completed       Problem: DISCHARGE PLANNING  Goal: Discharge to home or other facility with appropriate resources  Description: INTERVENTIONS:  - Identify barriers to discharge w/patient and caregiver  - Arrange for needed discharge resources and transportation as appropriate  - Identify discharge learning needs (meds, wound care, etc.)  - Arrange for interpretive services to assist at discharge as needed  - Refer to Case Management Department for coordinating discharge planning if the patient needs post-hospital services based on physician/advanced practitioner order or complex needs related to functional status, cognitive ability, or social support system  Outcome: Completed     Problem: Knowledge Deficit  Goal: Patient/family/caregiver demonstrates understanding of disease process, treatment plan, medications, and discharge instructions  Description: Complete learning assessment and assess knowledge base.   Interventions:  - Provide teaching at level of understanding  - Provide teaching via preferred learning methods  Outcome: Completed

## 2023-12-04 ENCOUNTER — APPOINTMENT (OUTPATIENT)
Dept: LAB | Facility: HOSPITAL | Age: 23
End: 2023-12-04
Attending: STUDENT IN AN ORGANIZED HEALTH CARE EDUCATION/TRAINING PROGRAM
Payer: COMMERCIAL

## 2023-12-04 DIAGNOSIS — O03.9 THREATENED ABORTION, DELIVERED: ICD-10-CM

## 2023-12-04 LAB — B-HCG SERPL-ACNC: 4747 MIU/ML (ref 0–5)

## 2023-12-04 PROCEDURE — 36415 COLL VENOUS BLD VENIPUNCTURE: CPT

## 2023-12-04 PROCEDURE — 84702 CHORIONIC GONADOTROPIN TEST: CPT

## 2023-12-07 LAB
BASOPHILS # BLD AUTO: 0 X10E3/UL (ref 0–0.2)
BASOPHILS NFR BLD AUTO: 0 %
EOSINOPHIL # BLD AUTO: 0.2 X10E3/UL (ref 0–0.4)
EOSINOPHIL NFR BLD AUTO: 2 %
ERYTHROCYTE [DISTWIDTH] IN BLOOD BY AUTOMATED COUNT: 15.1 % (ref 11.7–15.4)
HCT VFR BLD AUTO: 31.3 % (ref 34–46.6)
HGB BLD-MCNC: 10.2 G/DL (ref 11.1–15.9)
IMM GRANULOCYTES # BLD: 0.1 X10E3/UL (ref 0–0.1)
IMM GRANULOCYTES NFR BLD: 1 %
LYMPHOCYTES # BLD AUTO: 2.9 X10E3/UL (ref 0.7–3.1)
LYMPHOCYTES NFR BLD AUTO: 31 %
MCH RBC QN AUTO: 27.9 PG (ref 26.6–33)
MCHC RBC AUTO-ENTMCNC: 32.6 G/DL (ref 31.5–35.7)
MCV RBC AUTO: 86 FL (ref 79–97)
MONOCYTES # BLD AUTO: 0.6 X10E3/UL (ref 0.1–0.9)
MONOCYTES NFR BLD AUTO: 7 %
NEUTROPHILS # BLD AUTO: 5.7 X10E3/UL (ref 1.4–7)
NEUTROPHILS NFR BLD AUTO: 59 %
PLATELET # BLD AUTO: 322 X10E3/UL (ref 150–450)
RBC # BLD AUTO: 3.65 X10E6/UL (ref 3.77–5.28)
WBC # BLD AUTO: 9.6 X10E3/UL (ref 3.4–10.8)

## 2023-12-08 ENCOUNTER — APPOINTMENT (EMERGENCY)
Dept: ULTRASOUND IMAGING | Facility: HOSPITAL | Age: 23
End: 2023-12-08
Payer: COMMERCIAL

## 2023-12-08 ENCOUNTER — TELEPHONE (OUTPATIENT)
Dept: OBGYN CLINIC | Facility: CLINIC | Age: 23
End: 2023-12-08

## 2023-12-08 ENCOUNTER — HOSPITAL ENCOUNTER (EMERGENCY)
Facility: HOSPITAL | Age: 23
Discharge: HOME | End: 2023-12-08
Attending: OBSTETRICS & GYNECOLOGY
Payer: COMMERCIAL

## 2023-12-08 VITALS
HEART RATE: 92 BPM | SYSTOLIC BLOOD PRESSURE: 120 MMHG | DIASTOLIC BLOOD PRESSURE: 76 MMHG | OXYGEN SATURATION: 98 % | TEMPERATURE: 98.4 F | RESPIRATION RATE: 18 BRPM

## 2023-12-08 DIAGNOSIS — R89.9 ABNORMAL LABORATORY TEST: Primary | ICD-10-CM

## 2023-12-08 LAB
ABO GROUP BLD: NORMAL
ALBUMIN SERPL BCP-MCNC: 4.5 G/DL (ref 3.5–5)
ALP SERPL-CCNC: 59 U/L (ref 34–104)
ALT SERPL W P-5'-P-CCNC: 31 U/L (ref 7–52)
ANION GAP SERPL CALCULATED.3IONS-SCNC: 6 MMOL/L
AST SERPL W P-5'-P-CCNC: 18 U/L (ref 13–39)
B-HCG SERPL-ACNC: ABNORMAL MIU/ML (ref 0–5)
BILIRUB SERPL-MCNC: 0.29 MG/DL (ref 0.2–1)
BLD GP AB SCN SERPL QL: NEGATIVE
BUN SERPL-MCNC: 7 MG/DL (ref 5–25)
CALCIUM SERPL-MCNC: 9.4 MG/DL (ref 8.4–10.2)
CHLORIDE SERPL-SCNC: 106 MMOL/L (ref 96–108)
CO2 SERPL-SCNC: 24 MMOL/L (ref 21–32)
CREAT SERPL-MCNC: 0.47 MG/DL (ref 0.6–1.3)
ERYTHROCYTE [DISTWIDTH] IN BLOOD BY AUTOMATED COUNT: 14.8 % (ref 11.6–15.1)
GFR SERPL CREATININE-BSD FRML MDRD: 141 ML/MIN/1.73SQ M
GLUCOSE SERPL-MCNC: 97 MG/DL (ref 65–140)
HCT VFR BLD AUTO: 30.3 % (ref 34.8–46.1)
HGB BLD-MCNC: 10.1 G/DL (ref 11.5–15.4)
MCH RBC QN AUTO: 28.5 PG (ref 26.8–34.3)
MCHC RBC AUTO-ENTMCNC: 33.3 G/DL (ref 31.4–37.4)
MCV RBC AUTO: 86 FL (ref 82–98)
PLATELET # BLD AUTO: 313 THOUSANDS/UL (ref 149–390)
PMV BLD AUTO: 9.3 FL (ref 8.9–12.7)
POTASSIUM SERPL-SCNC: 3.7 MMOL/L (ref 3.5–5.3)
PROT SERPL-MCNC: 7.1 G/DL (ref 6.4–8.4)
RBC # BLD AUTO: 3.54 MILLION/UL (ref 3.81–5.12)
RH BLD: POSITIVE
SODIUM SERPL-SCNC: 136 MMOL/L (ref 135–147)
SPECIMEN EXPIRATION DATE: NORMAL
WBC # BLD AUTO: 9.27 THOUSAND/UL (ref 4.31–10.16)

## 2023-12-08 PROCEDURE — 36415 COLL VENOUS BLD VENIPUNCTURE: CPT | Performed by: OBSTETRICS & GYNECOLOGY

## 2023-12-08 PROCEDURE — 85027 COMPLETE CBC AUTOMATED: CPT | Performed by: OBSTETRICS & GYNECOLOGY

## 2023-12-08 PROCEDURE — 76817 TRANSVAGINAL US OBSTETRIC: CPT

## 2023-12-08 PROCEDURE — 86900 BLOOD TYPING SEROLOGIC ABO: CPT | Performed by: OBSTETRICS & GYNECOLOGY

## 2023-12-08 PROCEDURE — 99284 EMERGENCY DEPT VISIT MOD MDM: CPT | Performed by: STUDENT IN AN ORGANIZED HEALTH CARE EDUCATION/TRAINING PROGRAM

## 2023-12-08 PROCEDURE — 80053 COMPREHEN METABOLIC PANEL: CPT | Performed by: OBSTETRICS & GYNECOLOGY

## 2023-12-08 PROCEDURE — 86901 BLOOD TYPING SEROLOGIC RH(D): CPT | Performed by: OBSTETRICS & GYNECOLOGY

## 2023-12-08 PROCEDURE — 76816 OB US FOLLOW-UP PER FETUS: CPT

## 2023-12-08 PROCEDURE — 84702 CHORIONIC GONADOTROPIN TEST: CPT | Performed by: OBSTETRICS & GYNECOLOGY

## 2023-12-08 PROCEDURE — 99284 EMERGENCY DEPT VISIT MOD MDM: CPT

## 2023-12-08 PROCEDURE — 86850 RBC ANTIBODY SCREEN: CPT | Performed by: OBSTETRICS & GYNECOLOGY

## 2023-12-08 NOTE — TELEPHONE ENCOUNTER
Place call to [facility] ED, spoke to [facility] (charge nurse) regarding patients arrival and the need for patient to be confidential with a private exam by GYN consult. [facility] will make next shuft charge aware as well as registration. Patient is to inform registration upon her arrival.     Placed call to patient, reviewed with patient the above. Patient understood and had no additional questions.

## 2023-12-08 NOTE — PROGRESS NOTES
Gynecology Progress Note  Ena Gonzalez 26 y.o. female MRN: [id number]  Unit/Bed#: ED-41 Encounter: [id number]    Assessment/Plan:  21yo  with rising beta hCG s/p medical management of presumed spontaneous . By issue:    * Incomplete   Assessment & Plan  Patient has had rise in beta HCG after medical management (2 rounds of Misoprostol last week Tuesday and Wednesday). She has not had bleeding since last Wednesday. Her only complaint is mild pelvic cramping. Abdominal and pelvic exams today unremarkable. Previously, ultrasound showed an early IUP with no concerning adnexal masses. We will get a repeat CBC, CMP and hCG quant as well as pelvic ultrasound to assess the endometrium and adnexa in case this was previously a heterotopic pregancy. Next steps pending these results. Maintain NPO    Discussed with Dr. Damon Chavez. Subjective:   Farhad Tubbs presented today at the recommendation of her OBGYN. She has been undergoing management of a spontaneous/incomplete . First presented to care for this on 23. She had Misoprostol on . Bled down to hemoglobin of 6.2 and was transfused 2 units packed red cells. Second dose of Misoprostol given . She had hoped to avoid surgery at that time. hCG trend has been:  HCG, Quant   Date Value Ref Range Status   2023 4,747 (H) 0 - 5 mIU/mL Final   2023 2,218 (H) 0 - 5 mIU/mL Final   2023 2,589 (H) 0 - 5 mIU/mL Final   2023 1,425 (H) 0 - 5 mIU/mL Final     She denies vaginal bleeding since last Wednesday. Her only complaint today is mild abdominal cramping. Also denies fevers, chills, nausea or vomiting. Tolerating oral intake and last ate at noon today. Objective:   Vitals: Blood pressure 120/76, pulse 92, temperature 98.4 °F (36.9 °C), temperature source Oral, resp. rate 18, last menstrual period 2023, SpO2 98 %, not currently breastfeeding. Physical Exam:   Physical Exam  Vitals reviewed. Constitutional:       General: She is not in acute distress. Appearance: Normal appearance. She is not ill-appearing or toxic-appearing. Cardiovascular:      Rate and Rhythm: Normal rate. Pulmonary:      Effort: Pulmonary effort is normal. No respiratory distress. Abdominal:      Palpations: Abdomen is soft. Tenderness: There is no abdominal tenderness. There is no guarding or rebound. Genitourinary:     Comments: Normal external female genitalia. Normal vaginal mucosa. Nulliparous cervix. No vaginal bleeding. No cervical motion tenderness. Small, mobile uterus with no uterine or adnexal tenderness  Musculoskeletal:         General: No tenderness. Normal range of motion. Skin:     General: Skin is warm and dry. Neurological:      Mental Status: She is alert and oriented to person, place, and time. Psychiatric:         Mood and Affect: Mood normal.         Behavior: Behavior normal.         Lab, Imaging and other studies: CBC, CMP, hCG Quant, Type & Screen, pelvic ultrasound pending        Eliana Vicente MD  PGY-IV, OB/GYN  12/8/2023, 5:16 PM

## 2023-12-08 NOTE — ASSESSMENT & PLAN NOTE
Patient has had rise in beta HCG after medical management (2 rounds of Misoprostol last week Tuesday and Wednesday). She has not had bleeding since last Wednesday. Her only complaint is mild pelvic cramping. Abdominal and pelvic exams today unremarkable. Previously, ultrasound showed an early IUP with no concerning adnexal masses. We will get a repeat CBC, CMP and hCG quant as well as pelvic ultrasound to assess the endometrium and adnexa in case this was previously a heterotopic pregancy. Next steps pending these results. Maintain NPO    Discussed with Dr. Cornelia Keith.

## 2023-12-09 NOTE — DISCHARGE INSTRUCTIONS
Miscarriage   WHAT YOU NEED TO KNOW:   A miscarriage is the loss of a fetus within the first 20 weeks of pregnancy. A miscarriage may also be called a spontaneous  or an early pregnancy loss. DISCHARGE INSTRUCTIONS:   Seek care immediately if:   You have foul-smelling drainage or pus coming from your vagina. You have heavy vaginal bleeding and soak 1 pad or more in an hour. You have severe abdominal pain. You feel like your heart is beating faster than normal.    You feel extremely weak or dizzy. Contact your healthcare provider if:   You have a fever greater than 100.4°F or chills. You have extreme sadness, grief, or feel unable to cope with what has happened. You have questions or concerns about your condition or care. Self-care:   Do not put anything in your vagina for 2 weeks or as directed. Do not use tampons, douche, or have sex. These actions can cause infection and pain. Use sanitary pads as needed. You may have light bleeding or spotting for 2 weeks. Do not take a bath or go swimming for 2 weeks or as directed. These actions may increase your risk for an infection. Take showers only. Rest as needed. Slowly start to do more each day. Return to your daily activities as directed. Talk to your healthcare provider about birth control. If you would like to prevent another pregnancy, ask your healthcare provider which type of birth control is best for you. Join a support group or therapy to help you cope. A miscarriage may be very difficult for you, your partner, and other members of your family. There is no right way to feel after a miscarriage. You may feel overwhelming grief or other emotions. It may be helpful to talk to a friend, family member, or counselor about your feelings. You may worry that you could have another miscarriage. Talk to your healthcare provider about your concerns.  Your provider may be able to help you reduce the risk for another miscarriage. Your provider may also help you find ways to cope with grief. For more information:   [employer]  [address]  Phone: [phone]- [phone]  Phone: [phone]  Web Address: http://[employer]. org    [facility]  [address] , [address]  Web Address: [employer]  Follow up with your healthcare provider as directed: You may need to see your healthcare provider for blood tests or an ultrasound. Write down your questions so you remember to ask them during your visits. © Copyright Nilsa Garcia 2023 Information is for End User's use only and may not be sold, redistributed or otherwise used for commercial purposes. The above information is an  only. It is not intended as medical advice for individual conditions or treatments. Talk to your doctor, nurse or pharmacist before following any medical regimen to see if it is safe and effective for you.

## 2023-12-11 ENCOUNTER — ROUTINE PRENATAL (OUTPATIENT)
Dept: OBGYN CLINIC | Facility: CLINIC | Age: 23
End: 2023-12-11
Payer: COMMERCIAL

## 2023-12-11 ENCOUNTER — TELEPHONE (OUTPATIENT)
Dept: OBGYN CLINIC | Facility: CLINIC | Age: 23
End: 2023-12-11

## 2023-12-11 ENCOUNTER — PATIENT MESSAGE (OUTPATIENT)
Dept: OBGYN CLINIC | Facility: CLINIC | Age: 23
End: 2023-12-11

## 2023-12-11 VITALS — WEIGHT: 134 LBS | DIASTOLIC BLOOD PRESSURE: 70 MMHG | BODY MASS INDEX: 25.32 KG/M2 | SYSTOLIC BLOOD PRESSURE: 100 MMHG

## 2023-12-11 DIAGNOSIS — R10.9 ABDOMINAL PAIN: ICD-10-CM

## 2023-12-11 DIAGNOSIS — O03.9 MISCARRIAGE: Primary | ICD-10-CM

## 2023-12-11 PROCEDURE — S0190 MIFEPRISTONE, ORAL, 200 MG: HCPCS | Performed by: STUDENT IN AN ORGANIZED HEALTH CARE EDUCATION/TRAINING PROGRAM

## 2023-12-11 PROCEDURE — 99214 OFFICE O/P EST MOD 30 MIN: CPT | Performed by: STUDENT IN AN ORGANIZED HEALTH CARE EDUCATION/TRAINING PROGRAM

## 2023-12-11 RX ORDER — ONDANSETRON 4 MG/1
4 TABLET, ORALLY DISINTEGRATING ORAL EVERY 6 HOURS PRN
Qty: 15 TABLET | Refills: 0 | Status: SHIPPED | OUTPATIENT
Start: 2023-12-11

## 2023-12-11 RX ORDER — MIFEPRISTONE 200 MG/1
200 TABLET ORAL ONCE
Status: COMPLETED | OUTPATIENT
Start: 2023-12-11 | End: 2023-12-11

## 2023-12-11 RX ORDER — MISOPROSTOL 200 UG/1
800 TABLET ORAL ONCE
Qty: 4 TABLET | Refills: 0 | Status: SHIPPED | OUTPATIENT
Start: 2023-12-11 | End: 2023-12-11

## 2023-12-11 RX ORDER — MIFEPRISTONE 200 MG/1
200 TABLET ORAL ONCE
Status: CANCELLED | OUTPATIENT
Start: 2023-12-11 | End: 2023-12-11

## 2023-12-11 RX ADMIN — MIFEPRISTONE 200 MG: 200 TABLET ORAL at 12:13

## 2023-12-11 NOTE — PROGRESS NOTES
Assessment/Plan:  Adrián Todd is a 26 y.o. Odalys Jonathon with abnormal pregnancy who presents for consultation    1. Miscarriage  miSOPROStol (Cytotec) 200 mcg tablet    hCG, quantitative    CBC and Platelet    hCG, quantitative    CBC and Platelet    miFEPRIStone (Mifeprex) 200 mg      2. Abdominal pain  ondansetron (Zofran ODT) 4 mg disintegrating tablet            Previously discussed recommend against expectant management given has already tried and failed miso 800mcg PO x2, discussed mife+miso versus D&E, recommend D&E as best chance of success, but strongly desires to avoid surgical management  Reviewed medical management associated with intense vaginal bleeding (should check Hgb) and cramping can have nausea and vomiting  Reviewed risk of failure, risk of injection or heavy bleeding and needing urgent D&E  Opted for mife + miso today  Mifepristone consent signed today, provided today, miso and zofran sent to home pharmacy  Recommend hcg and cbc 24-48 hours after passes tissue, ordered  If this treatment does not work for her, recommend surgical management  Scheduled for follow-up with me next Monday  All questions answered to the best of my ability. Subjective:      Patient ID: Adrián Todd is a 26 y.o. female.     HPI  Abnormal pregnancy on ultrasound, hcg 10k, empty sac-like structure  No sign of ectopic or free fluid  S/p two doses of misoprostol orally last week when diagnosed with incomplete miscarriage  Hcg initially decreased and subsequently increased in keeping with incomplete response  Given ongoing increase recommend more definitive treatment  Declined D&E, continues to desire medical management  Reports ongoing crampiness, but no further bleeding  No discharge  No fevers    The following portions of the patient's history were reviewed and updated as appropriate: allergies, current medications, past medical history, past social history, past surgical history, and problem list.    Review of Systems --per hPI    Objective:  /70   Wt 60.8 kg (134 lb)   LMP 09/23/2023 (Exact Date)   BMI 25.32 kg/m²      Physical Exam  Constitutional:       Appearance: Normal appearance. HENT:      Head: Normocephalic and atraumatic. Nose: Nose normal.   Eyes:      Extraocular Movements: Extraocular movements intact. Conjunctiva/sclera: Conjunctivae normal.   Pulmonary:      Effort: Pulmonary effort is normal.   Musculoskeletal:         General: Normal range of motion. Cervical back: Normal range of motion. Neurological:      General: No focal deficit present. Mental Status: She is alert. Psychiatric:         Mood and Affect: Mood normal.         Behavior: Behavior normal.         Thought Content:  Thought content normal.

## 2023-12-11 NOTE — TELEPHONE ENCOUNTER
[facility] left message on machine - received prescription for patient ordered by Dr. Vines Kidney, misoprostol 200mcg. Would like to clarify instructions. Take 4 tablets by mouth but also states place in vagina. Needs to verify if inserting vaginally or taking by mouth. Call back to pharmacy [phone].

## 2023-12-14 LAB
ERYTHROCYTE [DISTWIDTH] IN BLOOD BY AUTOMATED COUNT: 14.4 % (ref 11.7–15.4)
HCG INTACT+B SERPL-ACNC: 1808 MIU/ML
HCT VFR BLD AUTO: 30.2 % (ref 34–46.6)
HGB BLD-MCNC: 9.9 G/DL (ref 11.1–15.9)
MCH RBC QN AUTO: 28 PG (ref 26.6–33)
MCHC RBC AUTO-ENTMCNC: 32.8 G/DL (ref 31.5–35.7)
MCV RBC AUTO: 85 FL (ref 79–97)
PLATELET # BLD AUTO: 307 X10E3/UL (ref 150–450)
RBC # BLD AUTO: 3.54 X10E6/UL (ref 3.77–5.28)
WBC # BLD AUTO: 5.8 X10E3/UL (ref 3.4–10.8)

## 2023-12-18 ENCOUNTER — ROUTINE PRENATAL (OUTPATIENT)
Dept: OBGYN CLINIC | Facility: CLINIC | Age: 23
End: 2023-12-18
Payer: COMMERCIAL

## 2023-12-18 VITALS
DIASTOLIC BLOOD PRESSURE: 68 MMHG | HEIGHT: 61 IN | SYSTOLIC BLOOD PRESSURE: 122 MMHG | BODY MASS INDEX: 25.11 KG/M2 | WEIGHT: 133 LBS

## 2023-12-18 DIAGNOSIS — O03.9 MISCARRIAGE: Primary | ICD-10-CM

## 2023-12-18 DIAGNOSIS — Z30.09 ENCOUNTER FOR COUNSELING REGARDING CONTRACEPTION: ICD-10-CM

## 2023-12-18 PROCEDURE — 99214 OFFICE O/P EST MOD 30 MIN: CPT | Performed by: STUDENT IN AN ORGANIZED HEALTH CARE EDUCATION/TRAINING PROGRAM

## 2023-12-18 NOTE — PROGRESS NOTES
Assessment/Plan:  Sandi Lopse is a 23 y.o.  with recent miscarriage who presents for follow-up      No problem-specific Assessment & Plan notes found for this encounter.       Diagnoses and all orders for this visit:    Miscarriage  -     hCG, quantitative; Future  -     hCG, quantitative    Encounter for counseling regarding contraception          Recommend hcg repeat 1 week from last, and then again in 1-2 weeks until back to zero  Hx of migraines with aura, reviewed recommendation to avoid estrogen-containing method; reviewed POP, Depo, Nexplanon, IUDs including Caitlyn, kyleena, Mirena  Reviewed risk of cramping, irregular bleeding, lighter periods  Most interested in IUD. Provided information on method (kyleena, Caitlyn, mirena; and will plan on calling to either request prescription or appointment for placement)  All questions answered to the best of my ability.      Subjective:      Patient ID: Sandi Lopes is a 23 y.o. female.    HPI    Diagnosed with miscarriage   S/p misoprostol x 2 without appropriate response  Hcg subsequently increased  Wanted to avoid surgery, received mife/miso with appropriate decrease from her hcg    23: 1425  23: 2589  23: 2218  23: 4714  23: 99691  23: took mifepristone in office  Used misprostol vaginally that night and passed tissue and bleeding, subsequently lightened  23: 1808    Just spotting now, cramping stopped a couple days ago  No fevers  No cramping  Feels more normal    BF in school in [place] had been visiting due to pregnancy/miscarriage  Cycle is very irregular    No tobacco use  No chtn  Hx of migraines with aura, had been on verapamil    The following portions of the patient's history were reviewed and updated as appropriate: allergies, current medications, past medical history, past social history, past surgical history, and problem list.    Review of Systems  --per HPI    Objective:  /68 (BP  "Location: Left arm, Patient Position: Sitting, Cuff Size: Standard)   Ht 5' 1\" (1.549 m)   Wt 60.3 kg (133 lb)   LMP 09/23/2023 (Exact Date)   BMI 25.13 kg/m²      Physical Exam  Constitutional:       Appearance: Normal appearance.   HENT:      Head: Normocephalic and atraumatic.      Nose: Nose normal.   Eyes:      Extraocular Movements: Extraocular movements intact.      Conjunctiva/sclera: Conjunctivae normal.   Pulmonary:      Effort: Pulmonary effort is normal.   Abdominal:      General: There is no distension.      Palpations: Abdomen is soft.      Tenderness: There is no abdominal tenderness. There is no guarding.   Musculoskeletal:         General: Normal range of motion.      Cervical back: Normal range of motion.   Neurological:      General: No focal deficit present.      Mental Status: She is alert.   Psychiatric:         Mood and Affect: Mood normal.         Behavior: Behavior normal.         Thought Content: Thought content normal.           TAUS with [facility]      "

## 2023-12-23 LAB — HCG INTACT+B SERPL-ACNC: 41 MIU/ML

## 2024-10-16 ENCOUNTER — OFFICE VISIT (OUTPATIENT)
Dept: FAMILY MEDICINE CLINIC | Facility: CLINIC | Age: 24
End: 2024-10-16
Payer: COMMERCIAL

## 2024-10-16 VITALS
DIASTOLIC BLOOD PRESSURE: 90 MMHG | BODY MASS INDEX: 24.73 KG/M2 | HEIGHT: 61 IN | TEMPERATURE: 96.1 F | HEART RATE: 75 BPM | SYSTOLIC BLOOD PRESSURE: 120 MMHG | RESPIRATION RATE: 16 BRPM | WEIGHT: 131 LBS | OXYGEN SATURATION: 99 %

## 2024-10-16 DIAGNOSIS — Z11.59 NEED FOR HEPATITIS C SCREENING TEST: ICD-10-CM

## 2024-10-16 DIAGNOSIS — M54.50 ACUTE MIDLINE LOW BACK PAIN WITHOUT SCIATICA: ICD-10-CM

## 2024-10-16 DIAGNOSIS — Z00.00 PHYSICAL EXAM, ANNUAL: Primary | ICD-10-CM

## 2024-10-16 DIAGNOSIS — R35.0 URINARY FREQUENCY: ICD-10-CM

## 2024-10-16 DIAGNOSIS — M54.2 NECK PAIN: ICD-10-CM

## 2024-10-16 PROBLEM — R42 DIZZINESS: Status: RESOLVED | Noted: 2022-08-24 | Resolved: 2024-10-16

## 2024-10-16 PROBLEM — O03.4 INCOMPLETE ABORTION: Status: RESOLVED | Noted: 2023-11-28 | Resolved: 2024-10-16

## 2024-10-16 PROBLEM — D62 ABLA (ACUTE BLOOD LOSS ANEMIA): Status: RESOLVED | Noted: 2023-11-28 | Resolved: 2024-10-16

## 2024-10-16 PROBLEM — M62.838 MUSCLE SPASM: Status: RESOLVED | Noted: 2023-07-30 | Resolved: 2024-10-16

## 2024-10-16 PROBLEM — R20.0 LEFT ARM NUMBNESS: Status: RESOLVED | Noted: 2022-05-26 | Resolved: 2024-10-16

## 2024-10-16 PROCEDURE — 99385 PREV VISIT NEW AGE 18-39: CPT | Performed by: FAMILY MEDICINE

## 2024-10-16 PROCEDURE — 99214 OFFICE O/P EST MOD 30 MIN: CPT | Performed by: FAMILY MEDICINE

## 2024-10-16 RX ORDER — NAPROXEN 500 MG/1
500 TABLET ORAL 2 TIMES DAILY WITH MEALS
Qty: 14 TABLET | Refills: 0 | Status: SHIPPED | OUTPATIENT
Start: 2024-10-16

## 2024-10-16 NOTE — PROGRESS NOTES
Ambulatory Visit  Name: Sandi Lopes      : 2000      MRN: [id number]  Encounter Provider: Maru Franco DO  Encounter Date: 10/16/2024   Encounter department: [facility]    Assessment & Plan  Physical exam, annual  Continue healthy diet and exercise  Labs as ordered    Orders:    CBC and differential; Future    Comprehensive metabolic panel; Future    Lipid panel; Future    Hemoglobin A1C; Future    TSH, 3rd generation; Future    Need for hepatitis C screening test    Orders:    Hepatitis C antibody; Future    Neck pain  Refer to spine/pain  ? Trigger point injections  Orders:    Ambulatory referral to Spine & Pain Management; Future    Acute midline low back pain without sciatica  Check xrays  Naproxen BID x 1 week  If no improvement, t/c PT   Orders:    XR spine lumbar minimum 4 views non injury; Future    naproxen (Naprosyn) 500 mg tablet; Take 1 tablet (500 mg total) by mouth 2 (two) times a day with meals    Urinary frequency  Suspect due to changes around time of menses  Check labs    Orders:    Urinalysis with microscopic; Future    Urine culture; Future       History of Present Illness     HPI  Pt presents for physical and back pain.    Preventively, pt up to date with imm's.  Due for labs.  Exercises regularly.  Sees dental.      Has had years of neck pain.  Did PT.  R sided neck pain which radiates down under scap.  No paresthesias or weakness in the arm.  Was referred to pain mgmt in the past because PT did not work, but she didn't go.      Weeks ago, she woke with low back pain.  Lasted a day.  Had never happened before.  Had b/l leg numbness/tingling.  R>L.  At this point, still having low back pain, but it doesn't hurt unless she touches it.  Goes to gym and does her usual activities without pain.  No weakness.  No urinary that are new, but she notes urinary frequency and irritation intermittently, particularly around period.      Review of Systems  "  Constitutional:  Negative for chills, fatigue, fever and unexpected weight change.   HENT:  Negative for congestion, ear pain, hearing loss, postnasal drip, rhinorrhea, sinus pressure, sinus pain, sore throat, trouble swallowing and voice change.    Eyes:  Negative for pain, redness and visual disturbance.   Respiratory:  Negative for cough and shortness of breath.    Cardiovascular:  Negative for chest pain, palpitations and leg swelling.   Gastrointestinal:  Negative for abdominal pain, constipation, diarrhea and nausea.   Endocrine: Negative for cold intolerance, heat intolerance, polydipsia and polyuria.   Genitourinary:  Negative for dysuria, frequency and urgency.   Musculoskeletal:  Positive for back pain and neck pain. Negative for arthralgias, joint swelling and myalgias.   Skin:  Negative for rash.        No suspicious lesions   Neurological:  Negative for weakness, numbness and headaches.   Hematological:  Negative for adenopathy.           Objective     /90 (BP Location: Left arm, Patient Position: Sitting, Cuff Size: Standard)   Pulse 75   Temp (!) 96.1 °F (35.6 °C) (Tympanic)   Resp 16   Ht 5' 1\" (1.549 m)   Wt 59.4 kg (131 lb)   SpO2 99%   BMI 24.75 kg/m²     Physical Exam  Constitutional:       Appearance: Normal appearance.   HENT:      Head: Normocephalic and atraumatic.      Right Ear: Tympanic membrane, ear canal and external ear normal.      Left Ear: Tympanic membrane, ear canal and external ear normal.      Nose: Nose normal. No congestion.      Mouth/Throat:      Mouth: Mucous membranes are moist.      Pharynx: No oropharyngeal exudate or posterior oropharyngeal erythema.   Eyes:      Extraocular Movements: Extraocular movements intact.      Conjunctiva/sclera: Conjunctivae normal.      Pupils: Pupils are equal, round, and reactive to light.   Neck:      Vascular: No carotid bruit.   Cardiovascular:      Rate and Rhythm: Normal rate and regular rhythm.      Heart sounds: No " murmur heard.     No friction rub. No gallop.   Pulmonary:      Effort: Pulmonary effort is normal.      Breath sounds: No wheezing, rhonchi or rales.   Abdominal:      General: Abdomen is flat. There is no distension.      Palpations: Abdomen is soft.      Tenderness: There is no abdominal tenderness.   Musculoskeletal:      Cervical back: Neck supple.      Comments: Pain to palpation lumbar spine.  No paraspinal pain.  No ROM restriction.  +parascapular pain.    Lymphadenopathy:      Cervical: No cervical adenopathy.   Neurological:      General: No focal deficit present.      Mental Status: She is alert and oriented to person, place, and time.      Cranial Nerves: No cranial nerve deficit.      Motor: No weakness.      Deep Tendon Reflexes: Reflexes normal.

## 2024-11-04 ENCOUNTER — HOSPITAL ENCOUNTER (OUTPATIENT)
Dept: RADIOLOGY | Facility: HOSPITAL | Age: 24
Discharge: HOME | End: 2024-11-04
Payer: COMMERCIAL

## 2024-11-04 DIAGNOSIS — M54.50 ACUTE MIDLINE LOW BACK PAIN WITHOUT SCIATICA: ICD-10-CM

## 2024-11-04 PROCEDURE — 72110 X-RAY EXAM L-2 SPINE 4/>VWS: CPT

## 2024-11-11 ENCOUNTER — CONSULT (OUTPATIENT)
Dept: PAIN MEDICINE | Facility: CLINIC | Age: 24
End: 2024-11-11
Payer: COMMERCIAL

## 2024-11-11 VITALS
BODY MASS INDEX: 24.55 KG/M2 | WEIGHT: 130 LBS | HEART RATE: 79 BPM | DIASTOLIC BLOOD PRESSURE: 71 MMHG | HEIGHT: 61 IN | SYSTOLIC BLOOD PRESSURE: 110 MMHG

## 2024-11-11 DIAGNOSIS — M79.18 MYOFASCIAL PAIN: ICD-10-CM

## 2024-11-11 DIAGNOSIS — M54.40 LOW BACK PAIN WITH SCIATICA, SCIATICA LATERALITY UNSPECIFIED, UNSPECIFIED BACK PAIN LATERALITY, UNSPECIFIED CHRONICITY: Primary | ICD-10-CM

## 2024-11-11 DIAGNOSIS — M54.2 NECK PAIN: ICD-10-CM

## 2024-11-11 PROCEDURE — 99244 OFF/OP CNSLTJ NEW/EST MOD 40: CPT | Performed by: ANESTHESIOLOGY

## 2024-11-11 RX ORDER — METHOCARBAMOL 750 MG/1
750 TABLET, FILM COATED ORAL 2 TIMES DAILY PRN
Qty: 60 TABLET | Refills: 1 | Status: SHIPPED | OUTPATIENT
Start: 2024-11-11

## 2024-11-11 NOTE — PROGRESS NOTES
Assessment  1. Low back pain with sciatica, sciatica laterality unspecified, unspecified back pain laterality, unspecified chronicity    2. Neck pain    3. Myofascial pain        Plan  24-year-old female referred by Dr. Franco, presenting for initial consultation regarding neck pain that radiates into the right trapezius with associated paresthesias in bilateral upper extremities for the past 5 years.  She denies any trauma or inciting event.  More recently over the past month, the patient has lumbosacral back pain which radiates into the lower extremities to the knees with associated numbness and paresthesias.  She denies any trauma or inciting event.  X-ray of the lumbar spine is normal.  X-ray of the cervical spine demonstrates reversal of cervical lordosis.  Disc spaces were preserved.  She has done physical therapy for her neck and low back in the past, however none within the past year.  She does take naproxen as needed without relief.  She does find some relief with heat and ice.  The patient does have a myofascial component contributing to her cervical and lumbar complaints.  Upper and lower extremity symptoms may be radicular in nature.  Fortunately she is neurologically intact on exam.    1.  I will order physical therapy for the patient cervical and lumbar complaints  2.  I will prescribe a trial of methocarbamol 750 mg twice daily as needed  3.  May consider trigger point injections in the future pending response to PT and muscle relaxant  4.  If the patient fails 6 weeks of conservative treatment we will order an MRI of the cervical and lumbar spine without contrast  5.  I will follow-up with the patient in 2 months        My impressions and treatment recommendations were discussed in detail with the patient who verbalized understanding and had no further questions.  Discharge instructions were provided. I personally saw and examined the patient and I agree with the above discussed plan of care.    No  orders of the defined types were placed in this encounter.    No orders of the defined types were placed in this encounter.      History of Present Illness    Sandi Lopes is a 24 y.o. female referred by Dr. Franco, presenting for initial consultation regarding neck pain that radiates into the right trapezius with associated paresthesias in bilateral upper extremities for the past 5 years.  She denies any trauma or inciting event.  More recently over the past month, the patient has lumbosacral back pain which radiates into the lower extremities to the knees with associated numbness and paresthesias.  She denies any trauma or inciting event.  X-ray of the lumbar spine is normal.  X-ray of the cervical spine demonstrates reversal of cervical lordosis.  Disc spaces were preserved.  She has done physical therapy for her neck and low back in the past, however none within the past year.  She does take naproxen as needed without relief.  She does find some relief with heat and ice.  The patient rates her pain a 3 out of 10 and the pain is nearly constant.  The pain is worse in the evening.  The pain is described as burning, shooting, numbness, sharp, pins-and-needles, throbbing, dull, and aching.  The pain is increased with sitting.  The pain is decreased with walking, exercise, and relaxation.      Other than as stated above, the patient denies any interval changes in medications, medical condition, mental condition, symptoms, or allergies since the last office visit.    I have personally reviewed and/or updated the patient's past medical history, past surgical history, family history, social history, current medications, allergies, and vital signs today.     Review of Systems   Constitutional:  Negative for fever and unexpected weight change.   HENT:  Negative for trouble swallowing.    Eyes:  Negative for visual disturbance.   Respiratory:  Negative for shortness of breath and wheezing.    Cardiovascular:   Negative for chest pain and palpitations.   Gastrointestinal:  Negative for constipation, diarrhea, nausea and vomiting.   Endocrine: Negative for cold intolerance, heat intolerance and polydipsia.   Genitourinary:  Negative for difficulty urinating and frequency.   Musculoskeletal:  Negative for arthralgias, gait problem, joint swelling and myalgias.   Skin:  Negative for rash.   Neurological:  Positive for numbness. Negative for dizziness, seizures, syncope, weakness and headaches.   Hematological:  Does not bruise/bleed easily.   Psychiatric/Behavioral:  Negative for dysphoric mood.    All other systems reviewed and are negative.      Patient Active Problem List   Diagnosis    Diverticulosis    Calculus of gallbladder without cholecystitis without obstruction    Migraine without aura    Ulnar neuropathy of left upper extremity    Nail abnormality    Hypotension    Pelvic pain    Spondylolisthesis of cervical region       Past Medical History:   Diagnosis Date    Gallstones     Internal hordeolum of right eye 8/8/2017    Migraines        No past surgical history on file.    Family History   Problem Relation Age of Onset    No Known Problems Mother     No Known Problems Father     Diabetes Maternal Grandmother     Hypertension Maternal Grandmother        Social History     Occupational History    Not on file   Tobacco Use    Smoking status: Never     Passive exposure: Never    Smokeless tobacco: Never   Vaping Use    Vaping status: Never Used   Substance and Sexual Activity    Alcohol use: Never    Drug use: Never    Sexual activity: Yes     Partners: Male     Birth control/protection: Condom Male       Current Outpatient Medications on File Prior to Visit   Medication Sig    naproxen (Naprosyn) 500 mg tablet Take 1 tablet (500 mg total) by mouth 2 (two) times a day with meals     No current facility-administered medications on file prior to visit.       No Known Allergies    Physical Exam    /71   Pulse 79   " Ht 5' 1\" (1.549 m)   Wt 59 kg (130 lb)   BMI 24.56 kg/m²     Constitutional: normal, well developed, well nourished, alert, in no distress and non-toxic and no overt pain behavior.  Eyes: anicteric  HEENT: grossly intact  Neck: supple, symmetric, trachea midline and no masses   Pulmonary:even and unlabored  Cardiovascular:No edema or pitting edema present  Skin:Normal without rashes or lesions and well hydrated  Psychiatric:Mood and affect appropriate  Neurologic:Cranial Nerves II-XII grossly intact  Musculoskeletal:normal gait.  Bilateral cervical paraspinals and right trapezius tender to palpation.  Bilateral biceps, triceps, and brachioradialis reflexes were 2/4 and symmetrical.  Negative Torres's reflex bilaterally.  Bilateral upper extremity strength 5/5 in all muscle groups.  Sensation intact to light touch in C5 through T1 dermatomes bilaterally.  Negative Spurling's bilaterally.  Bilateral lumbar paraspinals tender to palpation.  Bilateral SI joints nontender to palpation.  Bilateral trochanteric flares nontender to palpation.  Bilateral patellar and Achilles reflexes were 2/4 and symmetrical.  No clonus was noted bilaterally.  Bilateral lower extremity strength was 5/5 in all muscle groups.  Sensation intact to light touch in L3 thru S1 dermatomes bilaterally.  Negative straight leg raise bilaterally.  Negative Alvarado's and Gaenslen's test bilaterally.    Imaging    XR spine lumbar minimum 4 views non injury  Status: Final result     PACS Images     Show images for XR spine lumbar minimum 4 views non injury    XR spine lumbar minimum 4 views non injury: Result Notes     Maru Franco DO  11/5/2024 11:28 AM MESSI Junior,     Your xrays are normal.     Dr. Franco            Study Result    Narrative & Impression         LUMBAR SPINE     INDICATION:   Low back pain, unspecified.      COMPARISON:  None.     VIEWS:  XR SPINE LUMBAR MINIMUM 4 VIEWS NON INJURY     FINDINGS:     There are 5 non rib bearing " lumbar vertebral bodies.      There is no destructive osseous lesion.     Slight rightward thoracolumbar curvature.     No significant lumbar degenerative change noted.     The pedicles appear intact.     Soft tissues are unremarkable.     IMPRESSION:        No acute osseous abnormality.     Electronically signed: 11/04/2024 08:13 PM Negro Munguia MD     Imaging  CERVICAL SPINE     INDICATION:   R20.0: Anesthesia of skin.     COMPARISON:  None     VIEWS:  XR SPINE CERVICAL COMPLETE 4 OR 5 VW NON INJURY         FINDINGS:     No fracture.      C2-C3 through C4-C5 slight spondylolisthesis.  Anatomic alignment otherwise.  Mid cervical lordosis reversal.     The intervertebral disc spaces are preserved.  C5-C6, C6-C7 posterior osteophytes.     Dens obscured in open-mouth odontoid view     The neuroforamina are patent.     The prevertebral soft tissues are within normal limits.       The lung apices are clear.     IMPRESSION:     Cervical lordosis reversal.

## 2024-11-13 ENCOUNTER — APPOINTMENT (OUTPATIENT)
Dept: LAB | Facility: CLINIC | Age: 24
End: 2024-11-13
Payer: COMMERCIAL

## 2024-11-13 DIAGNOSIS — Z00.00 PHYSICAL EXAM, ANNUAL: ICD-10-CM

## 2024-11-13 DIAGNOSIS — Z11.59 NEED FOR HEPATITIS C SCREENING TEST: ICD-10-CM

## 2024-11-13 DIAGNOSIS — D62 ABLA (ACUTE BLOOD LOSS ANEMIA): ICD-10-CM

## 2024-11-13 DIAGNOSIS — R71.8 MICROCYTOSIS: ICD-10-CM

## 2024-11-13 DIAGNOSIS — R35.0 URINARY FREQUENCY: ICD-10-CM

## 2024-11-13 LAB
ALBUMIN SERPL BCG-MCNC: 4.8 G/DL (ref 3.5–5)
ALP SERPL-CCNC: 63 U/L (ref 34–104)
ALT SERPL W P-5'-P-CCNC: 40 U/L (ref 7–52)
ANION GAP SERPL CALCULATED.3IONS-SCNC: 8 MMOL/L (ref 4–13)
AST SERPL W P-5'-P-CCNC: 27 U/L (ref 13–39)
BACTERIA UR QL AUTO: NORMAL /HPF
BASOPHILS # BLD AUTO: 0.02 THOUSANDS/ÂΜL (ref 0–0.1)
BASOPHILS NFR BLD AUTO: 0 % (ref 0–1)
BILIRUB SERPL-MCNC: 0.56 MG/DL (ref 0.2–1)
BILIRUB UR QL STRIP: NEGATIVE
BUN SERPL-MCNC: 14 MG/DL (ref 5–25)
CALCIUM SERPL-MCNC: 9.4 MG/DL (ref 8.4–10.2)
CHLORIDE SERPL-SCNC: 102 MMOL/L (ref 96–108)
CHOLEST SERPL-MCNC: 182 MG/DL (ref ?–200)
CLARITY UR: CLEAR
CO2 SERPL-SCNC: 26 MMOL/L (ref 21–32)
COLOR UR: COLORLESS
CREAT SERPL-MCNC: 0.68 MG/DL (ref 0.6–1.3)
EOSINOPHIL # BLD AUTO: 0.19 THOUSAND/ÂΜL (ref 0–0.61)
EOSINOPHIL NFR BLD AUTO: 4 % (ref 0–6)
ERYTHROCYTE [DISTWIDTH] IN BLOOD BY AUTOMATED COUNT: 15 % (ref 11.6–15.1)
EST. AVERAGE GLUCOSE BLD GHB EST-MCNC: 123 MG/DL
GFR SERPL CREATININE-BSD FRML MDRD: 124 ML/MIN/1.73SQ M
GLUCOSE P FAST SERPL-MCNC: 87 MG/DL (ref 65–99)
GLUCOSE UR STRIP-MCNC: NEGATIVE MG/DL
HBA1C MFR BLD: 5.9 %
HCT VFR BLD AUTO: 39.7 % (ref 34.8–46.1)
HDLC SERPL-MCNC: 43 MG/DL
HGB BLD-MCNC: 12.9 G/DL (ref 11.5–15.4)
HGB UR QL STRIP.AUTO: NEGATIVE
IMM GRANULOCYTES # BLD AUTO: 0.01 THOUSAND/UL (ref 0–0.2)
IMM GRANULOCYTES NFR BLD AUTO: 0 % (ref 0–2)
KETONES UR STRIP-MCNC: NEGATIVE MG/DL
LDLC SERPL CALC-MCNC: 109 MG/DL (ref 0–100)
LEUKOCYTE ESTERASE UR QL STRIP: NEGATIVE
LYMPHOCYTES # BLD AUTO: 2.56 THOUSANDS/ÂΜL (ref 0.6–4.47)
LYMPHOCYTES NFR BLD AUTO: 49 % (ref 14–44)
MCH RBC QN AUTO: 26.4 PG (ref 26.8–34.3)
MCHC RBC AUTO-ENTMCNC: 32.5 G/DL (ref 31.4–37.4)
MCV RBC AUTO: 81 FL (ref 82–98)
MONOCYTES # BLD AUTO: 0.48 THOUSAND/ÂΜL (ref 0.17–1.22)
MONOCYTES NFR BLD AUTO: 9 % (ref 4–12)
NEUTROPHILS # BLD AUTO: 1.95 THOUSANDS/ÂΜL (ref 1.85–7.62)
NEUTS SEG NFR BLD AUTO: 38 % (ref 43–75)
NITRITE UR QL STRIP: NEGATIVE
NON-SQ EPI CELLS URNS QL MICRO: NORMAL /HPF
NONHDLC SERPL-MCNC: 139 MG/DL
NRBC BLD AUTO-RTO: 0 /100 WBCS
PH UR STRIP.AUTO: 6 [PH]
PLATELET # BLD AUTO: 265 THOUSANDS/UL (ref 149–390)
PMV BLD AUTO: 10.4 FL (ref 8.9–12.7)
POTASSIUM SERPL-SCNC: 4.2 MMOL/L (ref 3.5–5.3)
PROT SERPL-MCNC: 8 G/DL (ref 6.4–8.4)
PROT UR STRIP-MCNC: NEGATIVE MG/DL
RBC # BLD AUTO: 4.89 MILLION/UL (ref 3.81–5.12)
RBC #/AREA URNS AUTO: NORMAL /HPF
SODIUM SERPL-SCNC: 136 MMOL/L (ref 135–147)
SP GR UR STRIP.AUTO: 1.01 (ref 1–1.03)
TRIGL SERPL-MCNC: 148 MG/DL (ref ?–150)
TSH SERPL DL<=0.05 MIU/L-ACNC: 1.94 UIU/ML (ref 0.45–4.5)
UROBILINOGEN UR STRIP-ACNC: <2 MG/DL
WBC # BLD AUTO: 5.21 THOUSAND/UL (ref 4.31–10.16)
WBC #/AREA URNS AUTO: NORMAL /HPF

## 2024-11-13 PROCEDURE — 85025 COMPLETE CBC W/AUTO DIFF WBC: CPT

## 2024-11-13 PROCEDURE — 81001 URINALYSIS AUTO W/SCOPE: CPT

## 2024-11-13 PROCEDURE — 36415 COLL VENOUS BLD VENIPUNCTURE: CPT

## 2024-11-13 PROCEDURE — 87086 URINE CULTURE/COLONY COUNT: CPT

## 2024-11-13 PROCEDURE — 84443 ASSAY THYROID STIM HORMONE: CPT

## 2024-11-13 PROCEDURE — 86803 HEPATITIS C AB TEST: CPT

## 2024-11-13 PROCEDURE — 83036 HEMOGLOBIN GLYCOSYLATED A1C: CPT

## 2024-11-13 PROCEDURE — 80053 COMPREHEN METABOLIC PANEL: CPT

## 2024-11-13 PROCEDURE — 80061 LIPID PANEL: CPT

## 2024-11-14 LAB — HCV AB SER QL: NORMAL

## 2024-11-15 LAB — BACTERIA UR CULT: ABNORMAL

## 2024-11-18 ENCOUNTER — RESULTS FOLLOW-UP (OUTPATIENT)
Dept: FAMILY MEDICINE CLINIC | Facility: CLINIC | Age: 24
End: 2024-11-18

## 2024-11-18 DIAGNOSIS — R71.8 MICROCYTOSIS: Primary | ICD-10-CM

## 2024-11-18 NOTE — TELEPHONE ENCOUNTER
----- Message from Maru Franco DO sent at 11/18/2024  8:31 AM EST -----  Can we add on a ferritin?

## 2024-11-27 ENCOUNTER — EVALUATION (OUTPATIENT)
Dept: PHYSICAL THERAPY | Age: 24
End: 2024-11-27
Payer: COMMERCIAL

## 2024-11-27 DIAGNOSIS — M54.2 NECK PAIN: ICD-10-CM

## 2024-11-27 DIAGNOSIS — M54.40 LOW BACK PAIN WITH SCIATICA, SCIATICA LATERALITY UNSPECIFIED, UNSPECIFIED BACK PAIN LATERALITY, UNSPECIFIED CHRONICITY: ICD-10-CM

## 2024-11-27 LAB — MISCELLANEOUS LAB TEST RESULT: NORMAL

## 2024-11-27 PROCEDURE — 97161 PT EVAL LOW COMPLEX 20 MIN: CPT

## 2024-11-27 PROCEDURE — 97140 MANUAL THERAPY 1/> REGIONS: CPT

## 2024-11-27 PROCEDURE — 97110 THERAPEUTIC EXERCISES: CPT

## 2024-11-27 NOTE — PROGRESS NOTES
PT Evaluation     Today's date: 2024  Patient name: Sandi Rayo  : 2000  MRN: [id number]  Referring provider: Amrit Shultz DO  Dx:   Encounter Diagnosis     ICD-10-CM    1. Neck pain  M54.2 Ambulatory referral to Physical Therapy     PT plan of care cert/re-cert      2. Low back pain with sciatica, sciatica laterality unspecified, unspecified back pain laterality, unspecified chronicity  M54.40 Ambulatory referral to Physical Therapy     PT plan of care cert/re-cert          Start Time: 1400  Stop Time: 1500  Total time in clinic (min): 60 minutes    Assessment  Impairments: abnormal or restricted ROM, activity intolerance, impaired physical strength and pain with function    Assessment details: Patient reports to PT with c/c of neck and LBP with radicular sx into B/L LE. Pt demonstrate UE and LE strength and ROM WFL, but is limited by pain in neck or low back at end range of movement. Within functional assessment of OH motion ,pt demonstrates lack of normal thoracic extension, promoting forced end range in shoulders. Pt has hypomobile restriction of R C4-C5 facets causing pain with R cervical SB and ROT. Pt educated and demonstrated understanding of HEP. Pt is a good candidate for skilled PT to address cervical and thoracic hypomobility and lumbar radiculopathy.     Goals  SHORT TERM:  Patient will complete phase I of HEP.   Patient will improve LE strength by 1/2 grade.     LONG TERM:   Patient will complete phase II of HEP.  Patient will have normal thoracic joint play to return to functional activities.     Plan  Patient would benefit from: skilled physical therapy    Planned therapy interventions: balance/weight bearing training, functional ROM exercises, home exercise program, therapeutic exercise, therapeutic activities, strengthening, stretching, patient/caregiver education, neuromuscular re-education, manual therapy and joint mobilization    Frequency: 1-2x week  Duration in  weeks: 8  Plan of Care beginning date: 2024  Plan of Care expiration date: 1/15/2025  Treatment plan discussed with: patient  Plan details: Patient will complete skilled PT, including HEP, therapeutic exercise, therapeutic activity, neuromuscular reduction, and manual therapy, 1-2/week for 8 weeks to address cervical and thoracic hypomobility, and lumbar radiculopathy.        Subjective Evaluation    History of Present Illness  Mechanism of injury: Pt reports to PT with c/c chronic neck and acute LBP pain with radicular sx. Pt reports neck pain started about 4 years ago and describes it as constant achiness with burning into the R shoulder and scapular area. She feel the mid back worsen with pressure, like leaning back into a chair. Her LBP started insidiously 2 months after waking up. She described it as sharp across her low back and tingling into both legs. Over the last two weeks her LBP has not been a problem. 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 She notes she can't sit longer than 1hr at a time due to neck and back pain. Pt is I with ADL, work tasks and home tasks. She is regularly active by walking her dog and going to the gym. She notes she has some difficulty breathing and occasional R shoulder pain with running.         Patient Goals  Patient goals for therapy: decreased pain and increased motion  Patient goal: decrease pain  Pain  Current pain rating: 3 (neck)  At best pain rating: 3 (neck)  At worst pain ratin (neck)  Quality: dull ache, radiating, sharp, discomfort and burning (neck and low back)  Relieving factors: rest  Aggravating factors: sitting, overhead activity and running          Objective     Postural Observations  Seated posture: good  Standing posture: good      Neurological Testing     Additional Neurological Details  No UE altered sensation    Active Range of Motion   Cervical/Thoracic Spine       Thoracic    Flexion:  with pain Restriction level:  moderate  Extension:  with pain Restriction level: maximal  Left lateral flexion:  Restriction level: moderate  Right lateral flexion:  Restriction level: moderate  Left rotation:  Restriction level: moderate  Right rotation:  Restriction level: moderate    Lumbar   Normal active range of motion    Joint Play     Hypomobile: C4, C5, T1, T2, T3, T4, T5, T6, T7, T8, T9, T10, T11 and T12     Pain: C4, C5, T1, T2, T3, T4, T5, T6, T7, T8, T9, T10, T11 and T12     Strength/Myotome Testing   Cervical Spine     Left   Normal strength    Right   Normal strength    Lumbar   Left   Normal strength    Right   Normal strength    Tests   Cervical   Negative alar ligament test, Sharp-Caleb test, transverse ligament test and VBI.     Left   Negative cervical flexion-rotation test.     Right   Negative cervical flexion-rotation test.     Thoracic   Negative slump test.     Lumbar   Negative SIJ compression, sacroiliac distraction, Gaenslen's  and sacral thrust .     Left   Negative crossed SLR, passive SLR, quadrant and slump test.     Right   Negative crossed SLR, passive SLR, quadrant and slump test.     Left Pelvic Girdle/Sacrum   Negative: active SLR test.     Right Pelvic Girdle/Sacrum   Negative: active SLR test.     Left Hip   Negative CRISELDA and FADIR.     Right Hip   Negative CRISELDA and FADIR.     Functional Assessment        Comments  OH Press: Pt lacks thoracic extension with OH mvmt    General Comments:      Lumbar Comments  Lumbar segments are hypomobile              Precautions: N/A      Manuals 11/27            UPA Mob C4-C5 MS                                                   Neuro Re-Ed                                                                                                        Ther Ex             HEP 12min                                                                                                       Ther Activity                                       Gait Training                                        Modalities                                          Access Code: [email or web address]  URL: [email or web address]  Date: 11/27/2024  Prepared by: Pushpa Braxton    Exercises  - Cat Cow  - 3 x daily - 7 x weekly - 4 sets - 10 reps  - Prone Press Up  - 3 x daily - 7 x weekly - 4 sets - 10 reps  - Seated Cervical Retraction  - 3 x daily - 7 x weekly - 4 sets - 10 reps  - Supine Chin Tuck  - 3 x daily - 7 x weekly - 4 sets - 10 reps  - Seated Hamstring Stretch  - 3 x daily - 7 x weekly - 3 sets - 30s hold

## 2024-12-03 DIAGNOSIS — R73.01 IFG (IMPAIRED FASTING GLUCOSE): ICD-10-CM

## 2024-12-03 DIAGNOSIS — E61.1 IRON DEFICIENCY: Primary | ICD-10-CM

## 2024-12-04 ENCOUNTER — OFFICE VISIT (OUTPATIENT)
Dept: PHYSICAL THERAPY | Age: 24
End: 2024-12-04
Payer: COMMERCIAL

## 2024-12-04 DIAGNOSIS — M54.16 LUMBAR RADICULOPATHY: ICD-10-CM

## 2024-12-04 DIAGNOSIS — M54.12 CERVICAL RADICULOPATHY: Primary | ICD-10-CM

## 2024-12-04 PROCEDURE — 97140 MANUAL THERAPY 1/> REGIONS: CPT

## 2024-12-04 PROCEDURE — 97110 THERAPEUTIC EXERCISES: CPT

## 2024-12-04 NOTE — PROGRESS NOTES
Daily Note     Today's date: 2024  Patient name: Sandi Rayo  : 2000  MRN: [id number]  Referring provider: Amrit Shultz DO  Dx:   Encounter Diagnosis     ICD-10-CM    1. Cervical radiculopathy  M54.12       2. Lumbar radiculopathy  M54.16           Start Time: 0730  Stop Time: 0815  Total time in clinic (min): 45 minutes    Subjective: Patient reports she doesn't have any pain this morning. She doesn't normally have pain in the morning unless she slept uncomfortably the night before. Her neck and back did not bother her as much yesterday, but she didn't have to go to work. Patient reports she did have one episode of intense LBP since eval, that caused numbness in her R LE. It resolved on it's own later that day.      Objective: See treatment diary below. LLD observed in supine. L LE longer by 2cm.       Assessment:  One incidence of L low back and hip pain with plinth mobility. Rhythmic Stabilization performed to correct LLD successfully. Tolerated treatment well. Pt demonstrates improved thoracic flexion mobility with cat-cow exercise. Patient exhibited good technique with therapeutic exercises and would benefit from continued PT      Plan: Continue per plan of care.  Progress treatment as tolerated.  Progress core strengthening as tolerated.     Precautions: N/A      Manuals 11/27 12/3           UPA Mob C4-C5 MS            Thoracic P-A Mob  MS           Lumbar P-A Mob  MS           Hip Rhyth Stab  MS                        Neuro Re-Ed                                                                                                        Ther Ex             HEP 12min            UBE  4min           Seated Thoracic Ext   2x15           Doorway Pec Stretch   3x30s           Palloff Press             Wall V             PPU  2x15           Cat-cow  x10           Prone lying + MHP   10min                        Ther Activity                                       Gait Training                                        Modalities

## 2024-12-09 ENCOUNTER — OFFICE VISIT (OUTPATIENT)
Dept: PHYSICAL THERAPY | Age: 24
End: 2024-12-09
Payer: COMMERCIAL

## 2024-12-09 DIAGNOSIS — M54.12 CERVICAL RADICULOPATHY: ICD-10-CM

## 2024-12-09 DIAGNOSIS — M54.16 LUMBAR RADICULOPATHY: Primary | ICD-10-CM

## 2024-12-09 PROCEDURE — 97140 MANUAL THERAPY 1/> REGIONS: CPT

## 2024-12-09 PROCEDURE — 97110 THERAPEUTIC EXERCISES: CPT

## 2024-12-09 NOTE — PROGRESS NOTES
Daily Note     Today's date: 2024  Patient name: Sandi Rayo  : 2000  MRN: [id number]  Referring provider: Amrit Shultz DO  Dx:   Encounter Diagnosis     ICD-10-CM    1. Lumbar radiculopathy  M54.16       2. Cervical radiculopathy  M54.12           Start Time: 1045  Stop Time: 1130  Total time in clinic (min): 45 minutes    Subjective: Pt states she woke up this morning with a burning pain under her R scapula. She doesn't recall doing anything to start the pain. She did not move until the pain went down. She said trying to move when she feels that pain usually makes it worse. Pt reports she is still gets the burning pain in her mid back occasionally and doesn't like pressure from chairs on her back. She has had no change in LBP since last visit. She says it's no more intense, but still there. She reports she feels a little better than when she started PT but not a lot.       Objective: See treatment diary below    R Scap PROM Mobility: WFL; burning pn at inf angle of scap above 90deg ABD    MMT   R Inf Trap: 4-/5 (burning pn)  L Inf Trap: 4-/5    R Lat: 4/5  L Lat: 4/5    R SA: 4/5  L SA: 4/5      Assessment: Pt demonstrates improved thoracic mobility with cat-cow exercise, but still is still limited in thoracic extension. She is still TTP with manuals along T-spine and R scap - hypersensitivity to pressure on mid-back. MMT of R Inferior trap reproduces burning pain. UE numbness/tingling resolved with cervical retractions. Tolerated treatment well. Patient demonstrated fatigue post treatment, exhibited good technique with therapeutic exercises, and would benefit from continued PT      Plan: Continue per plan of care.  Progress treatment as tolerated.       Precautions: N/A      Manuals 11/27 12/3 12/9          UPA Mob C4-C5 MS            Thoracic P-A Mob  MS Grade 1-2 MS          S/L R Scap PROM mobility    MS          Lumbar P-A Mob  MS           Hip Rhyth Stab  MS                         Neuro Re-Ed                                                                                                        Ther Ex             HEP 12min            UBE  4min 4min          Cervical Retractions   3x10          Lat Pulls Downs   3x10 12#          Seated Thoracic Ext   2x15 2x10          Doorway Pec Stretch   3x30s           PNF D1 Ext   3x10 5#          ER   3x10 5#          IR   3x10 5#          Palloff Press             Wall V             PPU  2x15           Cat-cow  x10 2x10          Prone lying + MHP   10min 10min                       Ther Activity                                       Gait Training                                       Modalities

## 2024-12-11 ENCOUNTER — OFFICE VISIT (OUTPATIENT)
Dept: PHYSICAL THERAPY | Age: 24
End: 2024-12-11
Payer: COMMERCIAL

## 2024-12-11 DIAGNOSIS — M54.12 CERVICAL RADICULOPATHY: Primary | ICD-10-CM

## 2024-12-11 DIAGNOSIS — M54.16 LUMBAR RADICULOPATHY: ICD-10-CM

## 2024-12-11 PROCEDURE — 97110 THERAPEUTIC EXERCISES: CPT

## 2024-12-11 PROCEDURE — 97140 MANUAL THERAPY 1/> REGIONS: CPT

## 2024-12-11 NOTE — PROGRESS NOTES
Daily Note     Today's date: 2024  Patient name: Sandi Rayo  : 2000  MRN: [id number]  Referring provider: Amrit Shultz DO  Dx:   Encounter Diagnosis     ICD-10-CM    1. Cervical radiculopathy  M54.12       2. Lumbar radiculopathy  M54.16           Start Time: 1045  Stop Time: 1130  Total time in clinic (min): 45 minutes    Subjective: Pt reports she was a little sore after the last session, but then the pain in her mid and upper back went away. Her low back has not been bothering her either. Pt still reports sensitivity to pressure on her back form leaning back in a chair. Pt reported R sub-scap discomfort with breathing and roll out.       Objective: See treatment diary below      Assessment: Pt demonstrates normal thoracic flexion/extension mobility without pain today. Rotation mobility added today. Pt demonstrate decreased L trunk rotation compared to right rotation. Pt still demonstrates decreased cervical mobility with cervical reactions. Tolerated treatment well. Patient demonstrated fatigue post treatment, exhibited good technique with therapeutic exercises, and would benefit from continued PT      Plan: Continue per plan of care.  Progress treatment as tolerated.       Precautions: N/A      Manuals 11/27 12/3 12/9 12/11         UPA Mob C4-C5 MS            Thoracic P-A Mob  MS Grade 1-2 MS Grade 1-2 MS         S/L R Scap PROM mobility    MS          Lumbar P-A Mob  MS           Hip Rhyth Stab  MS           IASTM - Tspine                          Neuro Re-Ed             Diaphragmatic Breathing + ball roll    2x10                                                                                       Ther Ex             HEP 12min            UBE  4min 4min 3/3         Cervical Retractions   3x10 3x10         Lat Pulls Downs   3x10 12# 3x10 12#         Seated Thoracic Ext   2x15 2x10 2x10         Doorway Pec Stretch   3x30s           PNF D1 Ext   3x10 5# 3x10 5#         ER (R)   3x10 5#  3x10 5#         IR (R)   3x10 5# 3x10 5#          Palloff Press             Wall V             PPU  2x15  2x10         Cat-cow  x10 2x10 3x10         Quadruped T-spine Rot    2x10 ea         Prone lying + MHP   10min 10min                       Ther Activity                                       Gait Training                                       Modalities

## 2024-12-18 ENCOUNTER — OFFICE VISIT (OUTPATIENT)
Dept: PHYSICAL THERAPY | Age: 24
End: 2024-12-18
Payer: COMMERCIAL

## 2024-12-18 DIAGNOSIS — M54.12 CERVICAL RADICULOPATHY: ICD-10-CM

## 2024-12-18 DIAGNOSIS — M54.16 LUMBAR RADICULOPATHY: Primary | ICD-10-CM

## 2024-12-18 PROCEDURE — 97110 THERAPEUTIC EXERCISES: CPT

## 2024-12-18 NOTE — PROGRESS NOTES
Daily Note     Today's date: 2024  Patient name: Sandi Rayo  : 2000  MRN: [id number]  Referring provider: Amrit Shultz DO  Dx:   Encounter Diagnosis     ICD-10-CM    1. Lumbar radiculopathy  M54.16       2. Cervical radiculopathy  M54.12           Start Time: 1045  Stop Time: 1130  Total time in clinic (min): 45 minutes    Subjective: Pt states yesterday she went shopping and her right shoulder and mid back were hurting really bad. She says she did her stretches before and after shopping but it didn't change anything. Pt states she has not had LBP is a little while. She notices she gets really tense and holds her shoulders back.       Objective: See treatment diary below    Pt reaches end FLEX/EXT ROM with Car-Cow and PPU. - Lumbar and thoracic FLEX/EXT ROM WFL      Assessment: Strengthening progressed today. Tolerated treatment well. Patient demonstrated fatigue post treatment, exhibited good technique with therapeutic exercises, and would benefit from continued PT      Plan: Educated patient on diaphragmatic breathing and muscle relaxation techniques. Continue per plan of care.  Progress treatment as tolerated.       Precautions: N/A      Manuals 11/27 12/3 12/9 12/11 12/18        UPA Mob C4-C5 MS            Thoracic P-A Mob  MS Grade 1-2 MS Grade 1-2 MS         S/L R Scap PROM mobility    MS          Lumbar P-A Mob  MS           Hip Rhyth Stab  MS           IASTM - Tspine     MS                     Neuro Re-Ed             Diaphragmatic Breathing + ball roll    2x10                                                                                       Ther Ex             HEP 12min            UBE  4min 4min 3/3 3        Cervical Retractions   3x10 3x10              20# 3x8         Lat Pulls Downs   3x10 12# 3x10 12# 15# 3x8        Seated Thoracic Ext   2x15 2x10 2x10         Doorway Pec Stretch   3x30s           PNF D1 Ext   3x10 5# 3x10 5#         PNF D1 FLEX     3# 2x10        PNF D2  FLEX     5# 2x10        Reclined Pull-ups in // bars     3x5        ER (R)   3x10 5# 3x10 5#         IR (R)   3x10 5# 3x10 5#          Palloff Press             Wall V             PPU  2x15  2x10 2x10        Cat-cow  x10 2x10 3x10 2x10        Quadruped T-spine Rot    2x10 ea 2x10 ea        Prone lying + MHP   10min 10min                       Ther Activity                                       Gait Training                                       Modalities

## 2024-12-23 ENCOUNTER — OFFICE VISIT (OUTPATIENT)
Dept: PHYSICAL THERAPY | Age: 24
End: 2024-12-23
Payer: COMMERCIAL

## 2024-12-23 DIAGNOSIS — M54.12 CERVICAL RADICULOPATHY: Primary | ICD-10-CM

## 2024-12-23 DIAGNOSIS — M54.16 LUMBAR RADICULOPATHY: ICD-10-CM

## 2024-12-23 PROCEDURE — 97110 THERAPEUTIC EXERCISES: CPT

## 2024-12-23 PROCEDURE — 97140 MANUAL THERAPY 1/> REGIONS: CPT

## 2024-12-23 NOTE — PROGRESS NOTES
Daily Note     Today's date: 2024  Patient name: Sandi Rayo  : 2000  MRN: [id number]  Referring provider: Amrit Shultz DO  Dx:   Encounter Diagnosis     ICD-10-CM    1. Cervical radiculopathy  M54.12       2. Lumbar radiculopathy  M54.16           Start Time: 0830  Stop Time: 0915  Total time in clinic (min): 45 minutes    Subjective: Pt states since last session she worked 4 consecutive 12-hour shifts. After the 3rd shift she started getting intense burning mid-back pain and said it hurt to breathe. She took a muscle relaxer last night and is feeling better today. When she laid down yesterday she felt her legs going numb. It took a couple minutes for the feeling to come back. This was the first incidence of leg numbness she's had in a while since starting PT. She says she still gets random mid-back pain, but it's not as intense since she started PT.       Objective: See treatment diary below  Muscle knots palpated around R scapula.    Assessment:  Tolerated treatment well. Pt educated on how to incorporate stretches into work day and work environment. Patient demonstrated fatigue post treatment, exhibited good technique with therapeutic exercises, and would benefit from continued PT      Plan: Continue per plan of care.  Progress treatment as tolerated.       Precautions: N/A      Manuals 11/27 12/3 12/9 12/11 12/18 12/23       UPA Mob C4-C5 MS            Thoracic P-A Mob  MS Grade 1-2 MS Grade 1-2 MS         S/L R Scap PROM mobility    MS          Lumbar P-A Mob  MS           Hip Rhyth Stab  MS           IASTM - Tspine     MS MS                    Neuro Re-Ed             Diaphragmatic Breathing + ball roll    2x10                                                                                       Ther Ex             HEP 12min            UBE  4min 4min 3/3 3        Cervical Retractions   3x10 3x10         Rows     20# 3x8  20# 3x10       Lat Pulls Downs   3x10 12# 3x10 12# 15# 3x8 15#  3x10       OH Press      18# bar 3x8       Seated Thoracic Ext   2x15 2x10 2x10         Doorway Pec Stretch   3x30s           PNF D1 Ext   3x10 5# 3x10 5#         PNF D1 FLEX     3# 2x10 5# 3x8        PNF D2 FLEX     5# 2x10 5# 3x8        Reclined Pull-ups in // bars     3x5 3x5        ER (R)   3x10 5# 3x10 5#         IR (R)   3x10 5# 3x10 5#          Palloff Press             Wall V             PPU  2x15  2x10 2x10 2x10       Child's Pose      3x30s       Cat-cow  x10 2x10 3x10 2x10 2x10       Quadruped T-spine Rot    2x10 ea 2x10 ea 2x10  ea       Prone lying + MHP   10min 10min                       Ther Activity                                       Gait Training                                       Modalities

## 2024-12-30 ENCOUNTER — OFFICE VISIT (OUTPATIENT)
Dept: PHYSICAL THERAPY | Age: 24
End: 2024-12-30
Payer: COMMERCIAL

## 2024-12-30 DIAGNOSIS — M54.12 CERVICAL RADICULOPATHY: Primary | ICD-10-CM

## 2024-12-30 DIAGNOSIS — M54.16 LUMBAR RADICULOPATHY: ICD-10-CM

## 2024-12-30 PROCEDURE — 97110 THERAPEUTIC EXERCISES: CPT

## 2024-12-30 PROCEDURE — 97140 MANUAL THERAPY 1/> REGIONS: CPT

## 2024-12-30 NOTE — PROGRESS NOTES
Daily Note     Today's date: 2024  Patient name: Sandi Rayo  : 2000  MRN: [id number]  Referring provider: Amrit Shultz DO  Dx:   Encounter Diagnosis     ICD-10-CM    1. Cervical radiculopathy  M54.12       2. Lumbar radiculopathy  M54.16           Start Time: 0930  Stop Time: 1015  Total time in clinic (min): 45 minutes    Subjective: Patient states she worked over the weekend and felt fine until last night. Today the pain in her upper back is minimal. She says she still feels pain in her upper R thorax when she breathe in/out. It's not as intense as it used to be, but it's still there.      Objective: See treatment diary below      Assessment: Tolerated treatment well. Patient demonstrated fatigue post treatment, exhibited good technique with therapeutic exercises, and would benefit from continued PT      Plan: Continue per plan of care.  Progress treatment as tolerated.       Precautions: N/A      Manuals 11/27 12/3 12/9 12/11 12/18 12/23 12/30      UPA Mob C4-C5 MS            Thoracic P-A Mob  MS Grade 1-2 MS Grade 1-2 MS         S/L R Scap PROM mobility    MS          Lumbar P-A Mob  MS           Hip Rhyth Stab  MS           IASTM - Tspine     MS MS MS                   Neuro Re-Ed             Diaphragmatic Breathing + ball roll    2x10                                                                                       Ther Ex             HEP 12min            UBE  4min 4min 3/3 3        Cervical Retractions   3x10 3x10         Rows     20# 3x8  20# 3x10 Dark green TB 3x10      Lat Pulls Downs   3x10 12# 3x10 12# 15# 3x8 15# 3x10 Dark green TB 3x10      OH Press      18# bar 3x8 18# bar 3x10       Seated Thoracic Ext   2x15 2x10 2x10         Doorway Pec Stretch   3x30s           PNF D1 Ext   3x10 5# 3x10 5#         PNF D1 FLEX     3# 2x10 5# 3x8        PNF D2 FLEX     5# 2x10 5# 3x8        Reclined Pull-ups in // bars     3x5 3x5  3x5       ER (R)   3x10 5# 3x10 5#         IR (R)    3x10 5# 3x10 5#          Palloff Press             Wall V             PPU  2x15  2x10 2x10 2x10 3x10      Child's Pose      3x30s 3x30s      Cat-cow  x10 2x10 3x10 2x10 2x10 2x10      Quadruped T-spine Rot    2x10 ea 2x10 ea 2x10  ea 2x15      Prone lying + MHP   10min 10min                       Ther Activity                                       Gait Training                                       Modalities

## 2025-01-06 NOTE — PROGRESS NOTES
Assessment:  1. Low back pain with sciatica, sciatica laterality unspecified, unspecified back pain laterality, unspecified chronicity    2. Neck pain    3. Myofascial pain syndrome        Plan:  I will order an MRI of the cervical spine and lumbar spine without contrast  Continue with physical therapy and home exercise program  Patient may continue naproxen and methocarbamol as prescribed.  She does not require refills today   will discuss treatment options depending on results of imaging.  May also consider trigger point injections depending on imaging results such as trigger point injections versus epidural steroid injections    History of Present Illness:    The patient is a 24 y.o. female last seen on 11/11/2024  who presents for a follow up office visit in regards to chronic neck pain that will radiate toward the right trapezii musculature and right scapular region without any symptoms into the upper extremities for the past 5 years and low back pain that is nonradiating into the lower extremities.  Patient has been participating in physical therapy for both complaints since November 27, 2024 without any significant benefit.  She uses naproxen 500 mg as needed and methocarbamol 750 mg as needed without any relief.  X-ray of the cervical spine demonstrates reversal of cervical lordosis.  X-ray of the lumbar spine is normal    Patient rates her pain a 7 out of 10 on the numeric pain rating scale.  Pain is constant and described as burning, dull aching, sharp, pressure-like, shooting, numbness and pins-and-needles    I have personally reviewed and/or updated the patient's past medical history, past surgical history, family history, social history, current medications, allergies, and vital signs today.       Review of Systems:    Review of Systems      Past Medical History:   Diagnosis Date    Gallstones     Internal hordeolum of right eye 8/8/2017    Migraines        No past surgical history on file.    Family  History   Problem Relation Age of Onset    No Known Problems Mother     No Known Problems Father     Diabetes Maternal Grandmother     Hypertension Maternal Grandmother        Social History     Occupational History    Not on file   Tobacco Use    Smoking status: Never     Passive exposure: Never    Smokeless tobacco: Never   Vaping Use    Vaping status: Never Used   Substance and Sexual Activity    Alcohol use: Never    Drug use: Never    Sexual activity: Yes     Partners: Male     Birth control/protection: Condom Male         Current Outpatient Medications:     methocarbamol (Robaxin-750) 750 mg tablet, Take 1 tablet (750 mg total) by mouth 2 (two) times a day as needed for muscle spasms, Disp: 60 tablet, Rfl: 1    naproxen (Naprosyn) 500 mg tablet, Take 1 tablet (500 mg total) by mouth 2 (two) times a day with meals (Patient not taking: Reported on 1/7/2025), Disp: 14 tablet, Rfl: 0    No Known Allergies    Physical Exam:    There were no vitals taken for this visit.    Constitutional:normal, well developed, well nourished, alert, in no distress and non-toxic and no overt pain behavior.  Eyes:anicteric  HEENT:grossly intact  Neck:supple, symmetric, trachea midline and no masses   Pulmonary:even and unlabored  Cardiovascular:No edema or pitting edema present  Skin:Normal without rashes or lesions and well hydrated  Psychiatric:Mood and affect appropriate  Neurologic:Cranial Nerves II-XII grossly intact  Musculoskeletal:normal gait      Imaging  MRI lumbar spine wo contrast    (Results Pending)   MRI cervical spine wo contrast    (Results Pending)         Orders Placed This Encounter   Procedures    MRI lumbar spine wo contrast    MRI cervical spine wo contrast

## 2025-01-07 ENCOUNTER — OFFICE VISIT (OUTPATIENT)
Dept: PAIN MEDICINE | Facility: CLINIC | Age: 25
End: 2025-01-07
Payer: COMMERCIAL

## 2025-01-07 DIAGNOSIS — M54.2 NECK PAIN: ICD-10-CM

## 2025-01-07 DIAGNOSIS — M54.40 LOW BACK PAIN WITH SCIATICA, SCIATICA LATERALITY UNSPECIFIED, UNSPECIFIED BACK PAIN LATERALITY, UNSPECIFIED CHRONICITY: Primary | ICD-10-CM

## 2025-01-07 DIAGNOSIS — M79.18 MYOFASCIAL PAIN SYNDROME: ICD-10-CM

## 2025-01-07 PROCEDURE — 99214 OFFICE O/P EST MOD 30 MIN: CPT | Performed by: NURSE PRACTITIONER

## 2025-01-09 ENCOUNTER — OFFICE VISIT (OUTPATIENT)
Dept: PHYSICAL THERAPY | Age: 25
End: 2025-01-09
Payer: COMMERCIAL

## 2025-01-09 DIAGNOSIS — M54.12 CERVICAL RADICULOPATHY: Primary | ICD-10-CM

## 2025-01-09 DIAGNOSIS — M54.16 LUMBAR RADICULOPATHY: ICD-10-CM

## 2025-01-09 PROCEDURE — 97140 MANUAL THERAPY 1/> REGIONS: CPT

## 2025-01-09 PROCEDURE — 97110 THERAPEUTIC EXERCISES: CPT

## 2025-01-09 NOTE — PROGRESS NOTES
Daily Note     Today's date: 2025  Patient name: Sandi Rayo  : 2000  MRN: [id number]  Referring provider: Amrit Shultz DO  Dx:   Encounter Diagnosis     ICD-10-CM    1. Cervical radiculopathy  M54.12       2. Lumbar radiculopathy  M54.16           Start Time: 1400  Stop Time: 1445  Total time in clinic (min): 45 minutes    Subjective: Patient reports she felt good after last session, and then she went shopping and had work on Friday and Saturday and her R mid back hurt a lot. It hurt like it did before she started PT. She doesn't carry a purse over her shoulder when she shops and she doesn't carry bags in the R hand. She says she doesn't get pain when she walks normally. When asked what she thinks the difference between long walks and shopping is, she says it the action of flipping through clothing racks or continuously reaching forward. She rested  to today and feels fine today. Patient says last time she went to pain and spine her physician       Objective: See treatment diary below  ULTT1: R/L negative  ULTT2: R/L negative  ULTT3: R/L negative    Thoracic FLEXION/EXTENSION mobility: normal   Lumbar FLEXION/EXTENSION mobility: normal     Muscle Trigger point palpable in R mid back.     Assessment: Patient had sx reduction in mid-back following LLLT and gIASTM to R mid back musculature. Patient tested negative to upper limb nerve tensioning, but still has reports of nerve-like pain in R UE. She tolerated cervical traction well, with sx reduction including difficulty breathing) by end of session.  Patient exhibited good technique with therapeutic exercises and would benefit from continued PT      Plan: Continue per plan of care.  Progress treatment as tolerated.       Precautions: N/A      Manuals 11/27 12/3 12/9 12/11 12/18 12/23 12/30 1/9     UPA Mob C4-C5 MS            Thoracic P-A Mob  MS Grade 1-2 MS Grade 1-2 MS         S/L R Scap PROM mobility    MS          Lumbar P-A Mob  MS            Hip Rhyth Stab  MS           IASTM - Tspine     MS MS MS MS     LLLT        MS     Mechancical C- Traction        MS                  Neuro Re-Ed             Diaphragmatic Breathing + ball roll    2x10                                                                                       Ther Ex             HEP 12min            UBE  4min 4min 3/3 3   3/3     Cervical Retractions   3x10 3x10         Rows     20# 3x8  20# 3x10 Dark green TB 3x10      Lat Pulls Downs   3x10 12# 3x10 12# 15# 3x8 15# 3x10 Dark green TB 3x10      OH Press      18# bar 3x8 18# bar 3x10       Seated Thoracic Ext   2x15 2x10 2x10         Doorway Pec Stretch   3x30s           PNF D1 Ext   3x10 5# 3x10 5#         PNF D1 FLEX     3# 2x10 5# 3x8        PNF D2 FLEX     5# 2x10 5# 3x8        Reclined Pull-ups in // bars     3x5 3x5  3x5       ER (R)   3x10 5# 3x10 5#         IR (R)   3x10 5# 3x10 5#          Palloff Press             Wall V             PPU  2x15  2x10 2x10 2x10 3x10      Child's Pose      3x30s 3x30s      Cat-cow  x10 2x10 3x10 2x10 2x10 2x10 2x10     Quadruped T-spine Rot    2x10 ea 2x10 ea 2x10  ea 2x15      Prone lying + MHP   10min 10min                       Ther Activity                                       Gait Training                                       Modalities

## 2025-01-22 ENCOUNTER — OFFICE VISIT (OUTPATIENT)
Dept: PHYSICAL THERAPY | Age: 25
End: 2025-01-22
Payer: COMMERCIAL

## 2025-01-22 DIAGNOSIS — M54.12 CERVICAL RADICULOPATHY: Primary | ICD-10-CM

## 2025-01-22 DIAGNOSIS — M54.16 LUMBAR RADICULOPATHY: ICD-10-CM

## 2025-01-22 PROCEDURE — 97110 THERAPEUTIC EXERCISES: CPT

## 2025-01-22 PROCEDURE — 97140 MANUAL THERAPY 1/> REGIONS: CPT

## 2025-01-22 NOTE — PROGRESS NOTES
Daily Note     Today's date: 2025  Patient name: Sandi Rayo  : 2000  MRN: [id number]  Referring provider: Amrit Shultz DO  Dx:   Encounter Diagnosis     ICD-10-CM    1. Cervical radiculopathy  M54.12       2. Lumbar radiculopathy  M54.16           Start Time: 1400  Stop Time: 1445  Total time in clinic (min): 45 minutes    Subjective: Patient states she had neck and back pain during her vacation last week. It didn't hurt on the flight there, but she felt like she was stressed so she was holding her shoulder up. She said she was able to do activities in the morning but then her neck and/or back would start hurting more so she would need to find a place to rest. She presents today with minimal neck and back pain.       Objective: See treatment diary below      Assessment: Some sx reduction in neck and upper back by end of session. Rhythmic stab at hips corrected LLD. Tolerated treatment well. Patient demonstrated fatigue post treatment, exhibited good technique with therapeutic exercises, and would benefit from continued PT      Plan: Continue per plan of care.  Progress treatment as tolerated.       Precautions: N/A      Manuals 11/27 12/3 12/9 12/11 12/18 12/23 12/30 1/9 1/22    UPA Mob C4-C5 MS            Thoracic P-A Mob  MS Grade 1-2 MS Grade 1-2 MS         S/L R Scap PROM mobility    MS          Lumbar P-A Mob  MS           Hip Rhyth Stab  MS       MS    IASTM - Tspine     MS MS MS MS     LLLT        MS     Mechancical C- Traction        MS     EPAT         MS                 Neuro Re-Ed             Diaphragmatic Breathing + ball roll    2x10                                                                                       Ther Ex             HEP 12min            UBE  4min 4min 3/3 3   3/3     Cervical Retractions   3x10 3x10         Rows     20# 3x8  20# 3x10 Dark green TB 3x10      Lat Pulls Downs   3x10 12# 3x10 12# 15# 3x8 15# 3x10 Dark green TB 3x10      OH Press      18# bar  3x8 18# bar 3x10   12# bar 3x10    Wall V         3x10    LS Stretch          3z30s    Seated Thoracic Ext   2x15 2x10 2x10         Doorway Pec Stretch   3x30s           Horiz ABD          Orange 3x10     PNF D1 Ext   3x10 5# 3x10 5#     6# 3x10    PNF D1 FLEX     3# 2x10 5# 3x8        PNF D2 FLEX     5# 2x10 5# 3x8        Reclined Pull-ups in // bars     3x5 3x5  3x5       Bosu Wt shifts in plank         3x10    ER (R)   3x10 5# 3x10 5#         IR (R)   3x10 5# 3x10 5#          Palloff Press             Wall V             PPU  2x15  2x10 2x10 2x10 3x10  x10    Child's Pose      3x30s 3x30s      Cat-cow  x10 2x10 3x10 2x10 2x10 2x10 2x10 3x10    Quadruped T-spine Rot    2x10 ea 2x10 ea 2x10  ea 2x15      Prone lying + MHP   10min 10min                       Ther Activity                                       Gait Training                                       Modalities

## 2025-01-27 ENCOUNTER — HOSPITAL ENCOUNTER (OUTPATIENT)
Dept: MRI IMAGING | Facility: HOSPITAL | Age: 25
Discharge: HOME | End: 2025-01-27
Payer: COMMERCIAL

## 2025-01-27 DIAGNOSIS — M54.2 NECK PAIN: ICD-10-CM

## 2025-01-27 DIAGNOSIS — M54.40 LOW BACK PAIN WITH SCIATICA, SCIATICA LATERALITY UNSPECIFIED, UNSPECIFIED BACK PAIN LATERALITY, UNSPECIFIED CHRONICITY: ICD-10-CM

## 2025-01-27 PROCEDURE — 72141 MRI NECK SPINE W/O DYE: CPT

## 2025-01-27 PROCEDURE — 72148 MRI LUMBAR SPINE W/O DYE: CPT

## 2025-01-29 ENCOUNTER — OFFICE VISIT (OUTPATIENT)
Dept: PHYSICAL THERAPY | Age: 25
End: 2025-01-29
Payer: COMMERCIAL

## 2025-01-29 DIAGNOSIS — M54.16 LUMBAR RADICULOPATHY: ICD-10-CM

## 2025-01-29 DIAGNOSIS — M54.12 CERVICAL RADICULOPATHY: Primary | ICD-10-CM

## 2025-01-29 PROCEDURE — 97140 MANUAL THERAPY 1/> REGIONS: CPT

## 2025-01-29 PROCEDURE — 97110 THERAPEUTIC EXERCISES: CPT

## 2025-01-29 NOTE — PROGRESS NOTES
Daily Note     Today's date: 2025  Patient name: Sandi Rayo  : 2000  MRN: [id number]  Referring provider: Amrit Shultz DO  Dx:   Encounter Diagnosis     ICD-10-CM    1. Cervical radiculopathy  M54.12       2. Lumbar radiculopathy  M54.16           Start Time: 1400  Stop Time: 1445  Total time in clinic (min): 45 minutes    Subjective: Patient reports LBP and neck pain have been okay since last visit. She got her MRIs done last week of her neck and low back. Her L-spine is normal. She also has C5-6 and C6-7 minor disc herniations.      Objective: See treatment diary below      Assessment: Patient educated on pelvic tilting and core activation to promote lumbar stability during exercise. Tolerated treatment well. Patient demonstrated fatigue post treatment and exhibited good technique with therapeutic exercises. MRI confirms no major structural deformity, indicating patient will continue to benefit from skilled PT to address strengthening.       Plan: Continue per plan of care.  Progress treatment as tolerated.       Precautions: N/A      Manuals 11/27 12/3 12/9 12/11 12/18 12/23 12/30 1/9 1/22 1/29   UPA Mob C4-C5 MS            Thoracic P-A Mob  MS Grade 1-2 MS Grade 1-2 MS         S/L R Scap PROM mobility    MS          Lumbar P-A Mob  MS           Hip Rhyth Stab  MS       MS    IASTM - Tspine     MS MS MS MS     LLLT        MS     Mechancical C- Traction        MS     EPAT         MS    Cervical Retract+Ext           MS   Manual c-spine traction          MS                Neuro Re-Ed             Diaphragmatic Breathing + ball roll    2x10         Pelvic tilting           4min                                                                    Ther Ex             HEP 12min            UBE  4min 4min 3/3 3   3/3     Cervical Retractions   3x10 3x10         Rows     20# 3x8  20# 3x10 Dark green TB 3x10      Lat Pulls Downs   3x10 12# 3x10 12# 15# 3x8 15# 3x10 Dark green TB 3x10      OH Press       18# bar 3x8 18# bar 3x10   12# bar 3x10 12# bar 3x10    Wall V         3x10    LS Stretch          3x30s    Seated Thoracic Ext   2x15 2x10 2x10         Doorway Pec Stretch   3x30s           Horiz ABD          Orange 3x10  Orange 3x10    PNF D1 Ext   3x10 5# 3x10 5#     6# 3x10    PNF D1 FLEX     3# 2x10 5# 3x8        PNF D2 FLEX     5# 2x10 5# 3x8        Reclined Pull-ups in // bars     3x5 3x5  3x5       Bosu Wt shifts in plank         3x10    ER (R)   3x10 5# 3x10 5#         IR (R)   3x10 5# 3x10 5#          Palloff Press             Wall V             PPU  2x15  2x10 2x10 2x10 3x10  x10    Child's Pose      3x30s 3x30s      Cat-cow  x10 2x10 3x10 2x10 2x10 2x10 2x10 3x10    Quadruped T-spine Rot    2x10 ea 2x10 ea 2x10  ea 2x15      Prone lying + MHP   10min 10min          Supine march           3x10   Standing march           7.5# 3x10   Bird Dog           2x10   Leg Press          105# 5x5                Ther Activity                                       Gait Training                                       Modalities

## 2025-02-03 ENCOUNTER — OFFICE VISIT (OUTPATIENT)
Dept: PHYSICAL THERAPY | Age: 25
End: 2025-02-03
Payer: COMMERCIAL

## 2025-02-03 DIAGNOSIS — M54.12 CERVICAL RADICULOPATHY: Primary | ICD-10-CM

## 2025-02-03 DIAGNOSIS — M54.16 LUMBAR RADICULOPATHY: ICD-10-CM

## 2025-02-03 PROCEDURE — 97140 MANUAL THERAPY 1/> REGIONS: CPT

## 2025-02-03 PROCEDURE — 97110 THERAPEUTIC EXERCISES: CPT

## 2025-02-03 NOTE — PROGRESS NOTES
Daily Note     Today's date: 2/3/2025  Patient name: Sandi Rayo  : 2000  MRN: [id number]  Referring provider: Amrit Shultz DO  Dx:   Encounter Diagnosis     ICD-10-CM    1. Cervical radiculopathy  M54.12       2. Lumbar radiculopathy  M54.16           Start Time: 1130  Stop Time: 1215  Total time in clinic (min): 45 minutes    Subjective: Patient reports she felt good after last session. She had no pain at work until the end of her last. She report..      Objective: See treatment diary below      Assessment: Reduced cervical sx by end of session. Increased lumbar sx by end of session. Tolerated treatment well. Patient demonstrated fatigue post treatment, exhibited good technique with therapeutic exercises, and would benefit from continued PT      Plan: Continue per plan of care.  Progress treatment as tolerated.       Precautions: N/A      Manuals /3     12/23 12/30 1/9 1/22 1/29   UPA Mob C4-C5             Thoracic P-A Mob MS            S/L R Scap PROM mobility              Lumbar P-A Mob             Hip Rhyth Stab         MS    IASTM - Tspine      MS MS MS     LLLT        MS     Mechancical C- Traction        MS     EPAT         MS    Cervical Retract+Ext  MS         MS   Manual c-spine traction MS         MS                Neuro Re-Ed             Diaphragmatic Breathing + ball roll             Pelvic tilting           4min                                                                    Ther Ex             HEP             UBE        3/3     Cervical Retractions             Rows      20# 3x10 Dark green TB 3x10      Lat Pulls Downs      15# 3x10 Dark green TB 3x10      OH Press      18# bar 3x8 18# bar 3x10   12# bar 3x10 12# bar 3x10    Wall V         3x10    LS Stretch          3x30s    Seated Thoracic Ext              Doorway Pec Stretch              Horiz ABD  3x10 blue        Orange 3x10  Orange 3x10    No Money  3x10 blue             PNF D1 Ext         6# 3x10    PNF D1 FLEX      5#  3x8        PNF D2 FLEX      5# 3x8        Reclined Pull-ups in // bars      3x5  3x5       Bosu Wt shifts in plank         3x10    ER (R)             IR (R)             Palloff Press             Wall V             PPU      2x10 3x10  x10    Child's Pose      3x30s 3x30s      Cat-cow 2x10     2x10 2x10 2x10 3x10    Quadruped T-spine Rot      2x10  ea 2x15      Prone lying + MHP              Supine march           3x10   Standing march           7.5# 3x10   Bird Dog  2x10         2x10   Leg Press 110# 5x5         105# 5x5                Ther Activity                                       Gait Training                                       Modalities

## 2025-02-10 ENCOUNTER — OFFICE VISIT (OUTPATIENT)
Dept: PHYSICAL THERAPY | Age: 25
End: 2025-02-10
Payer: COMMERCIAL

## 2025-02-10 DIAGNOSIS — M54.16 LUMBAR RADICULOPATHY: ICD-10-CM

## 2025-02-10 DIAGNOSIS — M54.12 CERVICAL RADICULOPATHY: Primary | ICD-10-CM

## 2025-02-10 PROCEDURE — 97140 MANUAL THERAPY 1/> REGIONS: CPT

## 2025-02-10 PROCEDURE — 97110 THERAPEUTIC EXERCISES: CPT

## 2025-02-10 NOTE — PROGRESS NOTES
Daily Note     Today's date: 2/10/2025  Patient name: Sandi Rayo  : 2000  MRN: [id number]  Referring provider: Amrit Shultz DO  Dx:   Encounter Diagnosis     ICD-10-CM    1. Cervical radiculopathy  M54.12       2. Lumbar radiculopathy  M54.16           Start Time: 1400  Stop Time: 1445  Total time in clinic (min): 45 minutes    Subjective: Patient reports she felt fine after last session. Then she has excruciating neck and upper back pain on Saturday and . Today she says she feels fine because she hasn't done anything bother it today. She says it was the most pain she's had in her neck since starting PT.       Objective: See treatment diary below      Assessment: Cervical sx reduction by end of session. Encouraged patient to start going to back to her home gym. Tolerated treatment well. Patient exhibited good technique with therapeutic exercises and would benefit from continued PT      Plan: Continue per plan of care.  Progress treatment as tolerated.       Precautions: N/A      Manuals 2/3 2/10    12/23 12/30 1/9 1/22 1/29   UPA Mob C4-C5             Thoracic P-A Mob MS            S/L R Scap PROM mobility              Lumbar P-A Mob             Hip Rhyth Stab         MS    IASTM - Tspine      MS MS MS     LLLT        MS     Mechancical C- Traction  MS      MS     EPAT         MS    Cervical Retract+Ext  MS         MS   Manual c-spine traction MS         MS                Neuro Re-Ed             Diaphragmatic Breathing + ball roll             Pelvic tilting           4min                                                                    Ther Ex             HEP             UBE        3/3     Cervical Retractions             Rows  16# 3x10     20# 3x10 Dark green TB 3x10      Lat Pulls Downs  15# 3x10     15# 3x10 Dark green TB 3x10      OH Press      18# bar 3x8 18# bar 3x10   12# bar 3x10 12# bar 3x10    Wall V         3x10    LS Stretch          3x30s    Seated Thoracic Ext               Doorway Pec Stretch              Horiz ABD  3x10 blue        Orange 3x10  Orange 3x10    No Money  3x10 blue             PNF D1 Ext         6# 3x10    PNF D1 FLEX      5# 3x8        PNF D2 FLEX      5# 3x8        Reclined Pull-ups in // bars      3x5  3x5       Bosu Wt shifts in plank         3x10    ER (R)             IR (R)             Palloff Press             Wall V             PPU      2x10 3x10  x10    Child's Pose      3x30s 3x30s      Cat-cow 2x10     2x10 2x10 2x10 3x10    Quadruped T-spine Rot      2x10  ea 2x15      Prone lying + MHP              Supine march           3x10   Standing march           7.5# 3x10   Bird Dog  2x10         2x10   Leg Press 110# 5x5         105# 5x5                Ther Activity                                       Gait Training                                       Modalities

## 2025-02-20 ENCOUNTER — OFFICE VISIT (OUTPATIENT)
Dept: PHYSICAL THERAPY | Age: 25
End: 2025-02-20
Payer: COMMERCIAL

## 2025-02-20 DIAGNOSIS — M54.12 CERVICAL RADICULOPATHY: Primary | ICD-10-CM

## 2025-02-20 PROCEDURE — 97140 MANUAL THERAPY 1/> REGIONS: CPT

## 2025-02-20 NOTE — PROGRESS NOTES
Daily Note     Today's date: 2025  Patient name: Sandi Rayo  : 2000  MRN: [id number]  Referring provider: Amrit Shultz DO  Dx:   Encounter Diagnosis     ICD-10-CM    1. Cervical radiculopathy  M54.12           Start Time: 1400  Stop Time: 1445  Total time in clinic (min): 45 minutes    Subjective: Patient says she felt good after last session. She drove down to [place] on Monday and drove back to [place] yesterday. She said her R thoracic spine started hurting really bad. It hurts to breathe in/out.       Objective: See treatment diary below      Assessment: By end of session, patient reports no pain with breathing in, but end of session - still has some pain breathing out. Tolerated treatment well. Patient demonstrated fatigue post treatment, exhibited good technique with therapeutic exercises, and would benefit from continued PT      Plan: Continue per plan of care.  Progress treatment as tolerated.       Precautions: N/A      Manuals 2/3 2/10 2/20   12/23 12/30 1/9 1/22 1/29   UPA Mob C4-C5             Thoracic P-A Mob MS  MS          S/L R Scap PROM mobility              Lumbar P-A Mob             Hip Rhyth Stab         MS    IASTM - Tspine   MS   MS MS MS     LLLT   MS     MS     Mechancical C- Traction  MS      MS     EPAT         MS    Cervical Retract+Ext  MS         MS   Manual c-spine traction MS         MS                Neuro Re-Ed             Diaphragmatic Breathing + ball roll             Pelvic tilting           4min                                                                    Ther Ex             HEP             UBE        3/3     Cervical Retractions             Rows  16# 3x10     20# 3x10 Dark green TB 3x10      Lat Pulls Downs  15# 3x10     15# 3x10 Dark green TB 3x10      OH Press      18# bar 3x8 18# bar 3x10   12# bar 3x10 12# bar 3x10    Wall V         3x10    LS Stretch          3x30s    Seated Thoracic Ext              Doorway Pec Stretch              Horiz ABD   3x10 blue        Orange 3x10  Orange 3x10    No Money  3x10 blue             PNF D1 Ext         6# 3x10    PNF D1 FLEX      5# 3x8        PNF D2 FLEX      5# 3x8        Reclined Pull-ups in // bars      3x5  3x5       Bosu Wt shifts in plank         3x10    ER (R)             IR (R)             Palloff Press             Wall V             PPU      2x10 3x10  x10    Child's Pose      3x30s 3x30s      Cat-cow 2x10     2x10 2x10 2x10 3x10    Quadruped T-spine Rot      2x10  ea 2x15      Prone lying + MHP              Supine march           3x10   Standing march           7.5# 3x10   Bird Dog  2x10         2x10   Leg Press 110# 5x5         105# 5x5                Ther Activity                                       Gait Training                                       Modalities

## 2025-02-27 ENCOUNTER — OFFICE VISIT (OUTPATIENT)
Dept: PHYSICAL THERAPY | Age: 25
End: 2025-02-27
Payer: COMMERCIAL

## 2025-02-27 DIAGNOSIS — M54.16 LUMBAR RADICULOPATHY: Chronic | ICD-10-CM

## 2025-02-27 DIAGNOSIS — M54.12 CERVICAL RADICULOPATHY: Primary | Chronic | ICD-10-CM

## 2025-02-27 PROCEDURE — 97140 MANUAL THERAPY 1/> REGIONS: CPT

## 2025-02-27 NOTE — PROGRESS NOTES
Daily Note     Today's date: 2025  Patient name: Sandi Rayo  : 2000  MRN: [id number]  Referring provider: Amrit Shultz DO  Dx:   Encounter Diagnosis     ICD-10-CM    1. Cervical radiculopathy  M54.12       2. Lumbar radiculopathy  M54.16           Start Time: 1215  Stop Time: 1300  Total time in clinic (min): 45 minutes    Subjective: Patient reports the last week is the best she has felt since starting PT. She was able to do a lot more around the house without pain intense increase in pain. She is a little sore today from everything she did.       Objective: See treatment diary below      Assessment: Palpable muscle knots still presents in R scapular border and upper traps. Tolerated treatment well. Patient exhibited good technique with therapeutic exercises and would benefit from continued PT      Plan: Continue per plan of care.  Progress treatment as tolerated.       Precautions: N/A      Manuals /3 2/10 2/20 2/27         UPA Mob C4-C5             Thoracic P-A Mob MS  MS MS         S/L R Scap PROM mobility              Lumbar P-A Mob             Hip Rhyth Stab             IASTM - Tspine   MS MS         LLLT   MS MS         Mechancical C- Traction  MS           EPAT             Cervical Retract+Ext  MS            Manual c-spine traction MS                         Neuro Re-Ed             Diaphragmatic Breathing + ball roll             Pelvic tilting                                                                               Ther Ex             HEP             UBE             Cervical Retractions             Rows  16# 3x10            Lat Pulls Downs  15# 3x10            OH Press             Wall V             LS Stretch              Seated Thoracic Ext              Doorway Pec Stretch              Horiz ABD  3x10 blue            No Money  3x10 blue             PNF D1 Ext             PNF D1 FLEX             PNF D2 FLEX             Reclined Pull-ups in // bars             Bosu Wt shifts  in plank             ER (R)             IR (R)             Palloff Press             Wall V             PPU             Child's Pose             Cat-cow 2x10            Quadruped T-spine Rot             Prone lying + MHP              Supine march              Standing march              Bird Dog  2x10            Leg Press 110# 5x5                         Ther Activity                                       Gait Training                                       Modalities

## 2025-03-06 ENCOUNTER — OFFICE VISIT (OUTPATIENT)
Dept: PHYSICAL THERAPY | Age: 25
End: 2025-03-06
Payer: COMMERCIAL

## 2025-03-06 DIAGNOSIS — M54.16 LUMBAR RADICULOPATHY: Primary | ICD-10-CM

## 2025-03-06 PROCEDURE — 97140 MANUAL THERAPY 1/> REGIONS: CPT

## 2025-03-06 NOTE — PROGRESS NOTES
Daily Note     Today's date: 3/6/2025  Patient name: Sandi Rayo  : 2000  MRN: [id number]  Referring provider: Amrit Shultz DO  Dx:   Encounter Diagnosis     ICD-10-CM    1. Lumbar radiculopathy  M54.16           Start Time: 1430  Stop Time: 1515  Total time in clinic (min): 45 minutes    Subjective: Patient reports she felt good this past week. Her upper back didn't start bothering her until yesterday.       Objective: See treatment diary below      Assessment: Tolerated treatment well. Patient demonstrated fatigue post treatment, exhibited good technique with therapeutic exercises, and would benefit from continued PT      Plan: Continue per plan of care.  Progress treatment as tolerated.       Precautions: N/A      Manuals 2/3 2/10 2/20 2/27 3/6        UPA Mob C4-C5             Thoracic P-A Mob MS  MS MS         S/L R Scap PROM mobility              Lumbar P-A Mob             Hip Rhyth Stab             IASTM - Tspine   MS MS MS        LLLT   MS MS MS        Mechancical C- Traction  MS           EPAT             Cervical Retract+Ext  MS            Manual c-spine traction MS                         Neuro Re-Ed             Diaphragmatic Breathing + ball roll             Pelvic tilting                                                                               Ther Ex             HEP             UBE             Cervical Retractions             Rows  16# 3x10            Lat Pulls Downs  15# 3x10            OH Press             Wall V             LS Stretch              Seated Thoracic Ext              Doorway Pec Stretch              Horiz ABD  3x10 blue            No Money  3x10 blue             PNF D1 Ext             PNF D1 FLEX             PNF D2 FLEX             Reclined Pull-ups in // bars             Bosu Wt shifts in plank             ER (R)             IR (R)             Palloff Press             Wall V             PPU             Child's Pose             Cat-cow 2x10            Quadruped  T-spine Rot             Prone lying + MHP              Supine march              Standing march              Bird Dog  2x10            Leg Press 110# 5x5                         Ther Activity                                       Gait Training                                       Modalities

## 2025-03-12 ENCOUNTER — OFFICE VISIT (OUTPATIENT)
Dept: PHYSICAL THERAPY | Age: 25
End: 2025-03-12
Payer: COMMERCIAL

## 2025-03-12 DIAGNOSIS — M54.16 LUMBAR RADICULOPATHY: Primary | ICD-10-CM

## 2025-03-12 DIAGNOSIS — M54.12 CERVICAL RADICULOPATHY: ICD-10-CM

## 2025-03-12 PROCEDURE — 97140 MANUAL THERAPY 1/> REGIONS: CPT

## 2025-03-12 PROCEDURE — 97112 NEUROMUSCULAR REEDUCATION: CPT

## 2025-03-12 NOTE — PROGRESS NOTES
Daily Note     Today's date: 3/12/2025  Patient name: Sandi Rayo  : 2000  MRN: [id number]  Referring provider: Amrit Shultz DO  Dx:   Encounter Diagnosis     ICD-10-CM    1. Lumbar radiculopathy  M54.16       2. Cervical radiculopathy  M54.12           Start Time: 1400  Stop Time: 1445  Total time in clinic (min): 45 minutes    Subjective: Patient reports she felt good after last session. She only had a little soreness after her last shift. She says she still has some discomfort with breathing out.       Objective: See treatment diary below        Assessment: Decrease in palpable muscle knot size in R upper and mid traps. Patient educated on belly breathing  instead of chest breathing. Tolerated treatment well. Patient demonstrated fatigue post treatment, exhibited good technique with therapeutic exercises, and would benefit from continued PT      Plan: Continue per plan of care.  Progress treatment as tolerated.       Precautions: N/A      Manuals 2/3 2/10 2/20 2/27 3/6 3/12       UPA Mob C4-C5             Thoracic P-A Mob MS  MS MS  MS       S/L R Scap PROM mobility              Lumbar P-A Mob             Hip Rhyth Stab             IASTM - Tspine   MS MS MS MS       LLLT   MS MS MS MS       Mechancical C- Traction  MS           EPAT             Cervical Retract+Ext  MS            Manual c-spine traction MS                         Neuro Re-Ed             Diaphragmatic Breathing + ball roll      10min       Pelvic tilting                                                                               Ther Ex             HEP             UBE             Cervical Retractions             Rows  16# 3x10            Lat Pulls Downs  15# 3x10            OH Press             Wall V             LS Stretch              Seated Thoracic Ext              Doorway Pec Stretch              Horiz ABD  3x10 blue            No Money  3x10 blue             PNF D1 Ext             PNF D1 FLEX             PNF D2 FLEX              Reclined Pull-ups in // bars             Bosu Wt shifts in plank             ER (R)             IR (R)             Palloff Press             Wall V             PPU             Child's Pose             Cat-cow 2x10            Quadruped T-spine Rot             Prone lying + MHP              Supine march              Standing march              Bird Dog  2x10            Leg Press 110# 5x5                         Ther Activity                                       Gait Training                                       Modalities

## 2025-03-27 ENCOUNTER — OFFICE VISIT (OUTPATIENT)
Dept: PHYSICAL THERAPY | Age: 25
End: 2025-03-27
Payer: COMMERCIAL

## 2025-03-27 DIAGNOSIS — M54.16 LUMBAR RADICULOPATHY: Primary | ICD-10-CM

## 2025-03-27 DIAGNOSIS — M54.12 CERVICAL RADICULOPATHY: ICD-10-CM

## 2025-03-27 PROCEDURE — 97140 MANUAL THERAPY 1/> REGIONS: CPT

## 2025-03-27 NOTE — PROGRESS NOTES
Daily Note     Today's date: 3/27/2025  Patient name: Sandi Rayo  : 2000  MRN: [id number]  Referring provider: Amrit Shultz DO  Dx:   Encounter Diagnosis     ICD-10-CM    1. Lumbar radiculopathy  M54.16       2. Cervical radiculopathy  M54.12           Start Time: 1445  Stop Time: 1530  Total time in clinic (min): 45 minutes    Subjective: Patient states she felt good after last session. After completing her shift last Friday she felt intense pain in her neck. She says her neck felt so sensitive to pressure, she had to take her necklaces off. Her pain was so intense, she wanted to call to move her appt sooner this week, but she waited. The pain started to subside over the last two days. She is sore today.       Objective: See treatment diary below      Assessment: Tolerated treatment well. Patient demonstrates moderately restricted cervical retraction/extension ROM. Trigger points medial to medial border of R scapula are non-palpable. Trigger point in R upper trap is decreased in size but still palpable. Sx reduction by end of session. Patient demonstrated fatigue post treatment, exhibited good technique with therapeutic exercises, and would benefit from continued PT      Plan: Continue per plan of care.  Progress treatment as tolerated.       Precautions: N/A      Manuals 2/3 2/10 2/20 2/27 3/6 3/12 3/27      UPA Mob C4-C5             Thoracic P-A Mob MS  MS MS  MS       S/L R Scap PROM mobility              Lumbar P-A Mob             Hip Rhyth Stab             IASTM - Tspine   MS MS MS MS MS      LLLT   MS MS MS MS MS      Mechancical C- Traction  MS           EPAT             Cervical Retract+Ext  MS      MS      Manual c-spine traction MS                         Neuro Re-Ed             Diaphragmatic Breathing + ball roll      10min       Pelvic tilting                                                                               Ther Ex             HEP             UBE             Cervical  Retractions             Rows  16# 3x10            Lat Pulls Downs  15# 3x10            OH Press             Wall V             LS Stretch              Seated Thoracic Ext              Doorway Pec Stretch              Horiz ABD  3x10 blue            No Money  3x10 blue             PNF D1 Ext             PNF D1 FLEX             PNF D2 FLEX             Reclined Pull-ups in // bars             Bosu Wt shifts in plank             ER (R)             IR (R)             Palloff Press             Wall V             PPU             Child's Pose             Cat-cow 2x10            Quadruped T-spine Rot             Prone lying + MHP              Supine march              Standing march              Bird Dog  2x10            Leg Press 110# 5x5                         Ther Activity                                       Gait Training                                       Modalities

## 2025-04-08 ENCOUNTER — OFFICE VISIT (OUTPATIENT)
Dept: PHYSICAL THERAPY | Age: 25
End: 2025-04-08
Payer: COMMERCIAL

## 2025-04-08 DIAGNOSIS — M54.16 LUMBAR RADICULOPATHY: Primary | ICD-10-CM

## 2025-04-08 DIAGNOSIS — M54.12 CERVICAL RADICULOPATHY: ICD-10-CM

## 2025-04-08 PROCEDURE — 97110 THERAPEUTIC EXERCISES: CPT

## 2025-04-08 PROCEDURE — 97140 MANUAL THERAPY 1/> REGIONS: CPT

## 2025-04-08 NOTE — PROGRESS NOTES
Daily Note     Today's date: 2025  Patient name: Sandi Rayo  : 2000  MRN: [id number]  Referring provider: Amrit Shultz DO  Dx:   Encounter Diagnosis     ICD-10-CM    1. Lumbar radiculopathy  M54.16       2. Cervical radiculopathy  M54.12           Start Time: 1045  Stop Time: 1130  Total time in clinic (min): 45 minutes    Subjective: Patient got her wisdom teeth removed last week and didn't do any exercises while she recovered. Prior to the surgery, she said her back felt good. Today she reports B/L thoracolumbar pain.       Objective: See treatment diary below      Assessment: Tolerated treatment well. Thoracolumbar sx reduction by end of session. Palpable decrease in muscular adhesions in R upper traps and mid-scap region. Encouraged her to re-start her gym regime. Patient demonstrated fatigue post treatment, exhibited good technique with therapeutic exercises, and would benefit from continued PT      Plan: Continue per plan of care.  Progress treatment as tolerated.       Precautions: N/A      Manuals 2/3 2/10 2/20 2/27 3/6 3/12 3/27 4/8     UPA Mob C4-C5             Thoracic P-A Mob MS  MS MS  MS  MS     S/L R Scap PROM mobility              Lumbar P-A Mob             Hip Rhyth Stab             IASTM - Tspine   MS MS MS MS MS MS     LLLT   MS MS MS MS MS MS     Mechancical C- Traction  MS           EPAT             Cervical Retract+Ext  MS      MS      Manual c-spine traction MS                         Neuro Re-Ed             Diaphragmatic Breathing + ball roll      10min       Pelvic tilting                                                                               Ther Ex             HEP             UBE             Cervical Retractions             Rows  16# 3x10       Black 3x10      Lat Pulls Downs  15# 3x10            OH Press             Wall V        3x10      LS Stretch              Seated Thoracic Ext         3x10     Doorway Pec Stretch              Horiz ABD  3x10 blue             No Money  3x10 blue             PNF D1 Ext             PNF D1 FLEX             PNF D2 FLEX             Reclined Pull-ups in // bars             Bosu Wt shifts in plank             ER (R)             IR (R)             Palloff Press             Wall V             PPU             Child's Pose        3x10     Cat-cow 2x10       3x10      Quadruped T-spine Rot        2x30s ea     Prone lying + MHP              Supine march              Standing march              Bird Dog  2x10            Leg Press 110# 5x5                         Ther Activity                                       Gait Training                                       Modalities

## 2025-04-17 ENCOUNTER — OFFICE VISIT (OUTPATIENT)
Dept: PHYSICAL THERAPY | Age: 25
End: 2025-04-17
Payer: COMMERCIAL

## 2025-04-17 DIAGNOSIS — M54.12 CERVICAL RADICULOPATHY: ICD-10-CM

## 2025-04-17 DIAGNOSIS — M54.16 LUMBAR RADICULOPATHY: Primary | ICD-10-CM

## 2025-04-17 PROCEDURE — 97140 MANUAL THERAPY 1/> REGIONS: CPT

## 2025-04-17 NOTE — PROGRESS NOTES
Daily Note     Today's date: 2025  Patient name: Sandi Rayo  : 2000  MRN: [id number]  Referring provider: Amrit Shultz DO  Dx:   Encounter Diagnosis     ICD-10-CM    1. Lumbar radiculopathy  M54.16       2. Cervical radiculopathy  M54.12           Start Time: 1630  Stop Time: 1730  Total time in clinic (min): 60 minutes    Subjective: Patient report she feels okay today. She feels like the pain is out of her shoulders and more in her neck now.       Objective: See treatment diary below      Assessment: Tolerated treatment well. Patient demonstrates cervical retraction WFL and minimal restriction with extension. Educated patient on postural corrections for work setup with computer, phone use, and sleeping - to keep in mind avoiding forward neck posture. Sx reduction by end of session. Patient demonstrated fatigue post treatment, exhibited good technique with therapeutic exercises, and would benefit from continued PT      Plan: Continue per plan of care.  Progress treatment as tolerated.       Precautions: N/A      Manuals 2/3 2/10 2/20 2/27 3/6 3/12 3/27 4/8 4/17    UPA Mob C4-C5         MS    Thoracic P-A Mob MS  MS MS  MS  MS     S/L R Scap PROM mobility              Lumbar P-A Mob             Hip Rhyth Stab             IASTM - Tspine   MS MS MS MS MS MS MS    LLLT   MS MS MS MS MS MS MS    Mechancical C- Traction  MS           EPAT             Cervical Retract+Ext  MS      MS  MS    Manual c-spine traction MS                         Neuro Re-Ed             Diaphragmatic Breathing + ball roll      10min       Pelvic tilting                                                                               Ther Ex             HEP             UBE             Cervical Retractions             Rows  16# 3x10       Black 3x10      Lat Pulls Downs  15# 3x10            OH Press             Wall V        3x10      LS Stretch              Seated Thoracic Ext         3x10     Doorway Pec Stretch               Horiz ABD  3x10 blue            No Money  3x10 blue             PNF D1 Ext             PNF D1 FLEX             PNF D2 FLEX             Reclined Pull-ups in // bars             Bosu Wt shifts in plank             ER (R)             IR (R)             Palloff Press             Wall V             PPU             Child's Pose        3x10     Cat-cow 2x10       3x10      Quadruped T-spine Rot        2x30s ea     Prone lying + MHP              Supine march              Standing march              Bird Dog  2x10            Leg Press 110# 5x5                         Ther Activity                                       Gait Training                                       Modalities

## 2025-04-24 ENCOUNTER — OFFICE VISIT (OUTPATIENT)
Dept: PHYSICAL THERAPY | Age: 25
End: 2025-04-24
Payer: COMMERCIAL

## 2025-04-24 DIAGNOSIS — M54.16 LUMBAR RADICULOPATHY: Primary | ICD-10-CM

## 2025-04-24 DIAGNOSIS — M54.12 CERVICAL RADICULOPATHY: ICD-10-CM

## 2025-04-24 PROCEDURE — 97110 THERAPEUTIC EXERCISES: CPT

## 2025-04-24 PROCEDURE — 97140 MANUAL THERAPY 1/> REGIONS: CPT

## 2025-04-24 NOTE — PROGRESS NOTES
Daily Note     Today's date: 2025  Patient name: Sandi Rayo  : 2000  MRN: [id number]  Referring provider: Amrit Shultz DO  Dx:   Encounter Diagnosis     ICD-10-CM    1. Lumbar radiculopathy  M54.16       2. Cervical radiculopathy  M54.12           Start Time: 1145  Stop Time: 1230  Total time in clinic (min): 45 minutes    Subjective: Patient reports she feels mostly good today. She didn't have as much pain over the weekend as she normally does.       Objective: See treatment diary below      Assessment: Tolerated treatment well. Patient demonstrates end range cervical mobility with minimal discomfort. Returned to strengthening without sx reproduction.  Patient demonstrated fatigue post treatment, exhibited good technique with therapeutic exercises, and would benefit from continued PT      Plan: Continue per plan of care.  Progress treatment as tolerated.       Precautions: N/A      Manuals 2/3 2/10 2/20 2/27 3/6 3/12 3/27 4/8 4/17 4/24   UPA Mob C4-C5         MS    Thoracic P-A Mob MS  MS MS  MS  MS     IASTM - Tspine   MS MS MS MS MS MS MS    LLLT   MS MS MS MS MS MS MS    Mechancical C- Traction  MS           UT/LS Stretch           MS   Cervical Retract+Ext  MS      MS  MS MS   Manual c-spine traction MS                         Neuro Re-Ed             Diaphragmatic Breathing + ball roll      10min       Pelvic tilting                                                                               Ther Ex             HEP             Supine chin tucks          4x10   Cervical Retractions          2x10   Rows  16# 3x10       Black 3x10   Black 3x10   Lat Pulls Downs  15# 3x10         Purpl 3x10   OH Press             Wall V        3x10   3x10   Horiz ABD          Green 3x10    Seated Thoracic Ext         3x10  3x10   Horiz ABD  3x10 blue            No Money  3x10 blue             Child's Pose        3x10     Cat-cow 2x10       3x10      Quadruped T-spine Rot        2x30s ea     Bird Dog   2x10            Leg Press 110# 5x5                         Ther Activity                                       Gait Training                                       Modalities

## 2025-05-05 ENCOUNTER — OFFICE VISIT (OUTPATIENT)
Dept: PHYSICAL THERAPY | Age: 25
End: 2025-05-05
Attending: ANESTHESIOLOGY
Payer: COMMERCIAL

## 2025-05-05 DIAGNOSIS — M54.16 LUMBAR RADICULOPATHY: Primary | ICD-10-CM

## 2025-05-05 DIAGNOSIS — M54.12 CERVICAL RADICULOPATHY: ICD-10-CM

## 2025-05-05 PROCEDURE — 97140 MANUAL THERAPY 1/> REGIONS: CPT

## 2025-05-05 PROCEDURE — 97110 THERAPEUTIC EXERCISES: CPT

## 2025-05-05 NOTE — PROGRESS NOTES
Daily Note     Today's date: 2025  Patient name: Sandi Rayo  : 2000  MRN: [id number]  Referring provider: Amrit Shultz DO  Dx:   Encounter Diagnosis     ICD-10-CM    1. Lumbar radiculopathy  M54.16       2. Cervical radiculopathy  M54.12           Start Time: 1215  Stop Time: 1300  Total time in clinic (min): 45 minutes    Subjective: Patient reports this last weekend was the first weekend of working that she didn't have intense pain after. She says she doesn't have any pain today.       Objective: See treatment diary below      Assessment: Tolerated treatment well. Patient tolerates shoulder and core strengthening without sx irritation. Patient demonstrated fatigue post treatment, exhibited good technique with therapeutic exercises, and would benefit from continued PT      Plan: Continue per plan of care.  Progress treatment as tolerated.       Precautions: N/A      Manuals    UPA Mob C4-C5         MS    Thoracic P-A Mob        MS     IASTM - Tspine        MS MS    LLLT        MS MS    Mechancical C- Traction             UT/LS Stretch  MS         MS   Cervical Retract+Ext  MS        MS MS   Manual c-spine traction                          Neuro Re-Ed             Diaphragmatic Breathing + ball roll             Pelvic tilting                                                                               Ther Ex             HEP             Supine chin tucks 5x10         4x10   Cervical Retractions          2x10   Rows Dark green 3x10        Black 3x10   Black 3x10   Lat Pulls Downs          Purpl 3x10   Wall V Red 3x10        3x10   3x10   Horiz ABD Supine blue 3x10         Green 3x10    Seated Thoracic Ext  3x10       3x10  3x10   ITY on SB 4# 3x10             Tall Table med ball roll to side 6# 4x6ea            Child's Pose        3x10     Cat-cow        3x10      Quadruped T-spine Rot        2x30s ea     Bird Dog                                        Ther Activity                                        Gait Training                                       Modalities

## 2025-05-15 ENCOUNTER — OFFICE VISIT (OUTPATIENT)
Dept: PHYSICAL THERAPY | Age: 25
End: 2025-05-15
Attending: ANESTHESIOLOGY
Payer: COMMERCIAL

## 2025-05-15 DIAGNOSIS — M54.12 CERVICAL RADICULOPATHY: ICD-10-CM

## 2025-05-15 DIAGNOSIS — M54.16 LUMBAR RADICULOPATHY: Primary | ICD-10-CM

## 2025-05-15 PROCEDURE — 97110 THERAPEUTIC EXERCISES: CPT

## 2025-05-15 PROCEDURE — 97140 MANUAL THERAPY 1/> REGIONS: CPT

## 2025-05-15 NOTE — PROGRESS NOTES
Daily Note     Today's date: 5/15/2025  Patient name: Sandi Rayo  : 2000  MRN: [id number]  Referring provider: Amrit Shultz DO  Dx:   Encounter Diagnosis     ICD-10-CM    1. Lumbar radiculopathy  M54.16       2. Cervical radiculopathy  M54.12           Start Time: 0815  Stop Time: 0900  Total time in clinic (min): 45 minutes    Subjective: Patient says her neck and shoulder started bothering her last Saturday at work. It hurt so bad she had to schedule a PT appt for this week before going back to work this upcoming weekend.       Objective: See treatment diary below      Assessment: Tolerated treatment well. Palpable muscle knot in L UT. Sx reduction by end of session. Patient demonstrated fatigue post treatment, exhibited good technique with therapeutic exercises, and would benefit from continued PT      Plan: Continue per plan of care.  Progress treatment as tolerated.       Precautions: N/A      Manuals    UPA Mob C4-C5         MS    Thoracic P-A Mob        MS     IASTM - Tspine  MS (c-spine)      MS MS    LLLT        MS MS    Mechancical C- Traction             UT/LS Stretch  MS MS        MS   Cervical Retract+Ext  MS MS       MS MS   Sub-occipital release  MS           Manual c-spine traction                          Neuro Re-Ed             Diaphragmatic Breathing + ball roll             Pelvic tilting                                                                               Ther Ex             HEP             Supine chin tucks 5x10 6x10        4x10   Cervical Retractions          2x10   Rows Dark green 3x10        Black 3x10   Black 3x10   Lat Pulls Downs          Purpl 3x10   Wall V Red 3x10        3x10   3x10   Horiz ABD Supine blue 3x10         Green 3x10    Seated Thoracic Ext  3x10 3x10      3x10  3x10   ITY on SB 4# 3x10             Tall Table med ball roll to side 6# 4x6ea            Child's Pose        3x10     Cat-cow        3x10      Quadruped  T-spine Rot        2x30s brian     Bird Dog                                        Ther Activity                                       Gait Training                                       Modalities

## 2025-05-19 ENCOUNTER — APPOINTMENT (OUTPATIENT)
Dept: PHYSICAL THERAPY | Age: 25
End: 2025-05-19
Attending: ANESTHESIOLOGY
Payer: COMMERCIAL

## 2025-05-21 ENCOUNTER — OFFICE VISIT (OUTPATIENT)
Dept: PHYSICAL THERAPY | Age: 25
End: 2025-05-21
Attending: ANESTHESIOLOGY
Payer: COMMERCIAL

## 2025-05-21 DIAGNOSIS — M54.12 CERVICAL RADICULOPATHY: ICD-10-CM

## 2025-05-21 DIAGNOSIS — M54.16 LUMBAR RADICULOPATHY: Primary | ICD-10-CM

## 2025-05-21 PROCEDURE — 97140 MANUAL THERAPY 1/> REGIONS: CPT

## 2025-05-21 PROCEDURE — 97110 THERAPEUTIC EXERCISES: CPT

## 2025-05-21 NOTE — PROGRESS NOTES
Daily Note     Today's date: 2025  Patient name: Sandi Rayo  : 2000  MRN: [id number]  Referring provider: Amrit Shultz DO  Dx:   Encounter Diagnosis     ICD-10-CM    1. Lumbar radiculopathy  M54.16       2. Cervical radiculopathy  M54.12           Start Time: 1130  Stop Time: 1215  Total time in clinic (min): 45 minutes    Subjective: Patient reports at the beginning of this week she was carrying some heavy items around the house earlier this week and the pain in her mid-back started again. Her skin feels so sensitive, she had to take her necklaces off because they were irritating her neck. Patient reports she feels okay today, but she hasn't moving a lot today.       Objective: See treatment diary below      Assessment: Tolerated treatment well. Patient demonstrate cervical mobility with minimal restriction. Patient stretching and strengthening without sx reproduction by end of today's session. Patient demonstrated fatigue post treatment, exhibited good technique with therapeutic exercises, and would benefit from continued PT      Plan: Continue per plan of care.  Progress treatment as tolerated.       Precautions: N/A      Manuals    UPA Mob C4-C5         MS    Thoracic P-A Mob        MS     IASTM - Tspine  MS (c-spine) MS c-spine     MS MS    LLLT        MS MS    UT/LS Stretch  MS MS        MS   Cervical Retract+Ext  MS MS MS      MS MS   Sub-occipital release  MS MS                       Neuro Re-Ed             Diaphragmatic Breathing + ball roll             Pelvic tilting                                                                               Ther Ex             HEP             Supine chin tucks 5x10 6x10 3x10       4x10   Cervical Retractions          2x10   Rows Dark green 3x10        Black 3x10   Black 3x10   Lat Pulls Downs          Purpl 3x10   Wall V Red 3x10        3x10   3x10   Horiz ABD Supine blue 3x10         Green 3x10    Seated  Thoracic Ext  3x10 3x10      3x10  3x10   ITY on SB 4# 3x10             Tall Table med ball roll to side 6# 4x6ea            Child's Pose   2x30s     3x10     Lateral jerson pose   2x30s ea           Cat-cow   3x10     3x10      Cat-cow thread the needle   3x30s ea          Supine horiz ABD on roll   Black 3x10           Chest press on roll   18# 3x10          Quadruped T-spine Rot        2x30s ea     Bird Dog                                        Ther Activity                                       Gait Training                                       Modalities

## 2025-05-29 ENCOUNTER — OFFICE VISIT (OUTPATIENT)
Dept: PHYSICAL THERAPY | Age: 25
End: 2025-05-29
Attending: ANESTHESIOLOGY
Payer: COMMERCIAL

## 2025-05-29 DIAGNOSIS — M54.16 LUMBAR RADICULOPATHY: Primary | ICD-10-CM

## 2025-05-29 DIAGNOSIS — M54.12 CERVICAL RADICULOPATHY: ICD-10-CM

## 2025-05-29 PROCEDURE — 97140 MANUAL THERAPY 1/> REGIONS: CPT

## 2025-05-29 NOTE — PROGRESS NOTES
Daily Note     Today's date: 2025  Patient name: Sandi Rayo  : 2000  MRN: [id number]  Referring provider: Amrit Shultz DO  Dx:   Encounter Diagnosis     ICD-10-CM    1. Lumbar radiculopathy  M54.16       2. Cervical radiculopathy  M54.12           Start Time: 1530  Stop Time: 1615  Total time in clinic (min): 45 minutes    Subjective: Patient reports she feels good today, no pain in her neck or back, but she still has sensitivity in her mid back. She reports when she going through her closet, she felt the same intense pain in her neck and shoulder.       Objective: See treatment diary below      Assessment: Tolerated treatment well. Patient demonstrates greater scalene tightness on L compared to R. Completed today's session without sx reproduction.  Patient demonstrated fatigue post treatment, exhibited good technique with therapeutic exercises, and would benefit from continued PT      Plan: Assess TOS testing next session. Continue per plan of care.  Progress treatment as tolerated.       Precautions: N/A      Manuals    UPA Mob C4-C5    MS     MS    Thoracic P-A Mob        MS     IASTM - Tspine  MS (c-spine) MS c-spine MS scalenes    MS MS    Scalene stretch     MS         LLLT        MS MS    UT/LS Stretch  MS MS  MS      MS   Cervical Retract+Ext  MS MS MS MS     MS MS   Sub-occipital release  MS MS MS                      Neuro Re-Ed             Diaphragmatic Breathing + ball roll             Pelvic tilting                                                                               Ther Ex             HEP             Supine chin tucks 5x10 6x10 3x10       4x10   Cervical Retractions          2x10   Rows Dark green 3x10        Black 3x10   Black 3x10   Lat Pulls Downs          Purpl 3x10   Wall V Red 3x10        3x10   3x10   Horiz ABD Supine blue 3x10         Green 3x10    Seated Thoracic Ext  3x10 3x10      3x10  3x10   ITY on SB 4# 3x10              Tall Table med ball roll to side 6# 4x6ea            Child's Pose   2x30s     3x10     Lateral jerson pose   2x30s ea           Cat-cow   3x10     3x10      Cat-cow thread the needle   3x30s ea          Supine horiz ABD on roll   Black 3x10           Chest press on roll   18# 3x10          Quadruped T-spine Rot        2x30s ea     Bird Dog                                        Ther Activity                                       Gait Training                                       Modalities

## 2025-06-09 ENCOUNTER — OFFICE VISIT (OUTPATIENT)
Dept: PHYSICAL THERAPY | Age: 25
End: 2025-06-09
Attending: ANESTHESIOLOGY
Payer: COMMERCIAL

## 2025-06-09 DIAGNOSIS — M54.12 CERVICAL RADICULOPATHY: ICD-10-CM

## 2025-06-09 DIAGNOSIS — M54.16 LUMBAR RADICULOPATHY: Primary | ICD-10-CM

## 2025-06-09 PROCEDURE — 97140 MANUAL THERAPY 1/> REGIONS: CPT

## 2025-06-09 NOTE — PROGRESS NOTES
Daily Note     Today's date: 2025  Patient name: Sandi Rayo  : 2000  MRN: [id number]  Referring provider: Amrit Shultz DO  Dx:   Encounter Diagnosis     ICD-10-CM    1. Lumbar radiculopathy  M54.16       2. Cervical radiculopathy  M54.12           Start Time: 1100  Stop Time: 1145  Total time in clinic (min): 45 minutes    Subjective: Pain started Saturday night - tight and numb in lower C-spine and upper T-spine - but not as bad as it was a couple weeks ago. Patient reported her hips felt off.       Objective: See treatment diary below      Assessment: Tolerated treatment well. Patient demonstrates functional LLD - L LE longer than R LE. Corrected with muscle energy technique and sx alleviated. Patient demonstrated cervical retraction-extension mobility with moderate restriction. Tolerated scalene stretching. Reported medial border scapular pain, alleviated with corrected posture.  Patient would benefit from continued PT      Plan: Continue per plan of care.  Progress treatment as tolerated.       Precautions: N/A      Manuals         UPA Mob C4-C5    MS MS        Thoracic P-A Mob             IASTM - Tspine  MS (c-spine) MS c-spine MS scalenes MS        Scalene stretch     MS MS        LLLT             UT/LS Stretch  MS MS  MS MS        Cervical Retract+Ext  MS MS MS MS MS        Sub-occipital release  MS MS MS MS        Muscle energy     MS                     Neuro Re-Ed             Diaphragmatic Breathing + ball roll             Pelvic tilting                                                                               Ther Ex             HEP             Supine chin tucks 5x10 6x10 3x10          Cervical Retractions             Rows Dark green 3x10             Lat Pulls Downs             Wall V Red 3x10             Horiz ABD Supine blue 3x10            Seated Thoracic Ext  3x10 3x10           ITY on SB 4# 3x10             Tall Table med ball roll to side 6# 4x6ea             Child's Pose   2x30s          Lateral jerson pose   2x30s ea           Cat-cow   3x10          Cat-cow thread the needle   3x30s ea          Supine horiz ABD on roll   Black 3x10           Chest press on roll   18# 3x10          Quadruped T-spine Rot             Bird Dog                                        Ther Activity                                       Gait Training                                       Modalities

## 2025-06-24 ENCOUNTER — OFFICE VISIT (OUTPATIENT)
Dept: PHYSICAL THERAPY | Age: 25
End: 2025-06-24
Attending: ANESTHESIOLOGY
Payer: COMMERCIAL

## 2025-06-24 DIAGNOSIS — M54.12 CERVICAL RADICULOPATHY: ICD-10-CM

## 2025-06-24 DIAGNOSIS — M54.16 LUMBAR RADICULOPATHY: Primary | ICD-10-CM

## 2025-06-24 PROCEDURE — 97140 MANUAL THERAPY 1/> REGIONS: CPT

## 2025-06-24 PROCEDURE — 97110 THERAPEUTIC EXERCISES: CPT

## 2025-06-24 NOTE — PROGRESS NOTES
Daily Note     Today's date: 2025  Patient name: Sandi Rayo  : 2000  MRN: [id number]  Referring provider: Amrit Shultz DO  Dx:   Encounter Diagnosis     ICD-10-CM    1. Lumbar radiculopathy  M54.16       2. Cervical radiculopathy  M54.12           Start Time: 1115  Stop Time: 1215  Total time in clinic (min): 60 minutes    Subjective: Patient reports her neck and back have been hurting a lot over the last two weeks. She says since starting PT she feels like she has more good days than bad days, but when the pain comes back it is just as intense. She would like to continue PT as she feels like she is still benefiting from PT.       Objective: See treatment diary below      Assessment: Tolerated treatment well. Palpable rib flare around T8-T9 on L side, partially relieved following manual mobilizations.Tolerates session without sx aggravation. Patient is progressing overall with greater pain-free days. Patient demonstrated fatigue post treatment, exhibited good technique with therapeutic exercises, and would benefit from continued PT      Plan: Continue per plan of care.  Progress treatment as tolerated.       Precautions: N/A      Manuals        UPA Mob C4-C5    MS MS        Thoracic P-A Mob      MS       IASTM - Tspine  MS (c-spine) MS c-spine MS scalenes MS MS       Scalene stretch     MS MS        LLLT             UT/LS Stretch  MS MS  MS MS        Cervical Retract+Ext  MS MS MS MS MS        Sub-occipital release  MS MS MS MS        Muscle energy     MS                     Neuro Re-Ed             Diaphragmatic Breathing + ball roll             Pelvic tilting                                                                               Ther Ex             HEP             Supine chin tucks 5x10 6x10 3x10          Cervical Retractions             Rows Dark green 3x10             Lat Pulls Downs             Wall V Red 3x10             Horiz ABD Supine blue 3x10             Seated Thoracic Ext  3x10 3x10    3x10        Supine on roll + horiz abd      Blue TB 3x10        Supine on roll + shoulder flex       Poll 3# 3x10        ITY on SB 4# 3x10             Tall Table med ball roll to side 6# 4x6ea            Child's Pose   2x30s          Lateral jerson pose   2x30s ea           Cat-cow   3x10          Cat-cow thread the needle   3x30s ea          Supine horiz ABD on roll   Black 3x10           Chest press on roll   18# 3x10          Quadruped T-spine Rot             Bird Dog                                        Ther Activity                                       Gait Training                                       Modalities

## 2025-07-01 ENCOUNTER — OFFICE VISIT (OUTPATIENT)
Dept: PHYSICAL THERAPY | Age: 25
End: 2025-07-01
Attending: ANESTHESIOLOGY
Payer: COMMERCIAL

## 2025-07-01 DIAGNOSIS — M54.16 LUMBAR RADICULOPATHY: Primary | ICD-10-CM

## 2025-07-01 DIAGNOSIS — M54.12 CERVICAL RADICULOPATHY: ICD-10-CM

## 2025-07-01 PROCEDURE — 97110 THERAPEUTIC EXERCISES: CPT

## 2025-07-01 PROCEDURE — 97140 MANUAL THERAPY 1/> REGIONS: CPT

## 2025-07-01 NOTE — PROGRESS NOTES
Daily Note     Today's date: 2025  Patient name: Sandi Rayo  : 2000  MRN: [id number]  Referring provider: Amrit Shultz DO  Dx:   Encounter Diagnosis     ICD-10-CM    1. Lumbar radiculopathy  M54.16       2. Cervical radiculopathy  M54.12           Start Time: 0805  Stop Time: 900  Total time in clinic (min): 55 minutes    Subjective: Patient reports she is feeling better today than she did last week. She has central mid back pain today.       Objective: See treatment diary below      Assessment: Tolerated treatment well. Patient demonstrates cervical mobility with minimal restriction today. Still possesses some L rib elevation T5-8. Completes today's session without sx aggravation. Patient demonstrated fatigue post treatment, exhibited good technique with therapeutic exercises, and would benefit from continued PT      Plan: Continue per plan of care.  Progress treatment as tolerated.       Precautions: N/A      Manuals       UPA Mob C4-C5    MS MS        Thoracic P-A Mob      MS MS      IASTM - Tspine  MS (c-spine) MS c-spine MS scalenes MS MS       Scalene stretch     MS MS        LLLT             UT/LS Stretch  MS MS  MS MS  MS      Cervical Retract+Ext  MS MS MS MS MS  MS      Sub-occipital release  MS MS MS MS  MS      Muscle energy     MS                     Neuro Re-Ed             Diaphragmatic Breathing + ball roll             Pelvic tilting                                                                               Ther Ex             HEP             Supine chin tucks 5x10 6x10 3x10    3x10      Cervical Retractions             Rows Dark green 3x10       Dark green 3x10       PNF D2       Light green 2x10 ea       PNF D1       Light green 2x10 ea       Lat Pulls Downs             Wall V Red 3x10             Horiz ABD Supine blue 3x10            Seated Thoracic Ext  3x10 3x10    3x10  3x10       Supine on roll + horiz abd      Blue TB 3x10  Black  3x10       Supine on roll + shoulder flex       Poll 3# 3x10        ITY on SB 4# 3x10       1# 2x10       PPU        X10       Tall Table med ball roll to side 6# 4x6ea            Child's Pose   2x30s          Lateral jerson pose   2x30s ea           Cat-cow   3x10          Cat-cow thread the needle   3x30s ea          Chest press on roll   18# 3x10                                    Ther Activity                                       Gait Training                                       Modalities

## 2025-07-08 ENCOUNTER — OFFICE VISIT (OUTPATIENT)
Dept: PHYSICAL THERAPY | Age: 25
End: 2025-07-08
Attending: ANESTHESIOLOGY
Payer: COMMERCIAL

## 2025-07-08 DIAGNOSIS — M54.16 LUMBAR RADICULOPATHY: Primary | ICD-10-CM

## 2025-07-08 DIAGNOSIS — M54.12 CERVICAL RADICULOPATHY: ICD-10-CM

## 2025-07-08 PROCEDURE — 97140 MANUAL THERAPY 1/> REGIONS: CPT

## 2025-07-08 PROCEDURE — 97110 THERAPEUTIC EXERCISES: CPT

## 2025-07-08 NOTE — PROGRESS NOTES
Daily Note     Today's date: 2025  Patient name: Sandi Rayo  : 2000  MRN: [id number]  Referring provider: Amrit Shultz DO  Dx:   Encounter Diagnosis     ICD-10-CM    1. Lumbar radiculopathy  M54.16       2. Cervical radiculopathy  M54.12           Start Time: 0900  Stop Time: 0950  Total time in clinic (min): 50 minutes    Subjective: Patient reports she feels okay. Her R dione-scapular area is bothering her today.       Objective: See treatment diary below      Assessment: Tolerated treatment well. Posses R periscapular muscle tightness compared to L side. Demonstrates good scapular mobility and symmetry with UE exercise. Sx reduction by end of session. Patient demonstrated fatigue post treatment, exhibited good technique with therapeutic exercises, and would benefit from continued PT      Plan: Continue per plan of care.  Progress treatment as tolerated.       Precautions: N/A      Manuals      UPA Mob C4-C5    MS MS        Thoracic P-A Mob      MS MS      IASTM - Tspine  MS (c-spine) MS c-spine MS scalenes MS MS       Scalene stretch     MS MS        LLLT             UT/LS Stretch  MS MS  MS MS  MS MS     Cervical Retract+Ext  MS MS MS MS MS  MS MS     Sub-occipital release  MS MS MS MS  MS MS     EPAT - R UT        MS     Muscle energy     MS                     Neuro Re-Ed             Diaphragmatic Breathing + ball roll             Pelvic tilting                                                                               Ther Ex             HEP             Supine chin tucks 5x10 6x10 3x10    3x10 3x10     Cervical Retractions             Rows Dark green 3x10       Dark green 3x10  Dark green 3x10     PNF D2       Light green 2x10 ea  Light green 3x10 ea     PNF D1       Light green 2x10 ea  Light green 3x10 ea     Lat Pulls Downs             Wall V Red 3x10             Horiz ABD Supine blue 3x10            Seated Thoracic Ext  3x10 3x10    3x10  3x10   3x10      Supine on roll + horiz abd      Blue TB 3x10  Black 3x10  3x10 blue      Supine on roll + shoulder flex       Poll 3# 3x10        ITY on SB 4# 3x10       1# 2x10       PPU        X10       Tall Table med ball roll to side 6# 4x6ea            Child's Pose   2x30s          Lateral jerson pose   2x30s ea           Cat-cow   3x10          Cat-cow thread the needle   3x30s ea          Chest press on roll   18# 3x10                                    Ther Activity                                       Gait Training                                       Modalities

## 2025-07-15 ENCOUNTER — OFFICE VISIT (OUTPATIENT)
Dept: PHYSICAL THERAPY | Age: 25
End: 2025-07-15
Attending: ANESTHESIOLOGY
Payer: COMMERCIAL

## 2025-07-15 DIAGNOSIS — M54.16 LUMBAR RADICULOPATHY: Primary | ICD-10-CM

## 2025-07-15 DIAGNOSIS — M54.12 CERVICAL RADICULOPATHY: ICD-10-CM

## 2025-07-15 PROCEDURE — 97110 THERAPEUTIC EXERCISES: CPT

## 2025-07-15 PROCEDURE — 97140 MANUAL THERAPY 1/> REGIONS: CPT

## 2025-07-22 ENCOUNTER — OFFICE VISIT (OUTPATIENT)
Dept: PHYSICAL THERAPY | Age: 25
End: 2025-07-22
Attending: ANESTHESIOLOGY
Payer: COMMERCIAL

## 2025-07-22 DIAGNOSIS — M54.12 CERVICAL RADICULOPATHY: ICD-10-CM

## 2025-07-22 DIAGNOSIS — M54.16 LUMBAR RADICULOPATHY: Primary | ICD-10-CM

## 2025-07-22 PROCEDURE — 97140 MANUAL THERAPY 1/> REGIONS: CPT

## 2025-07-22 PROCEDURE — 97110 THERAPEUTIC EXERCISES: CPT

## 2025-07-22 NOTE — PROGRESS NOTES
Daily Note     Today's date: 2025  Patient name: Sandi Rayo  : 2000  MRN: [id number]  Referring provider: Amrit Shultz DO  Dx:   Encounter Diagnosis     ICD-10-CM    1. Lumbar radiculopathy  M54.16       2. Cervical radiculopathy  M54.12           Start Time: 1230  Stop Time: 1315  Total time in clinic (min): 45 minutes    Subjective: Patient reports her shoulders hurt less but she still has come numbness in the back of her neck.       Objective: See treatment diary below      Assessment: Tolerated treatment well. Continues to improve cervical mobility. Sx reduction by end of session. Patient demonstrated fatigue post treatment, exhibited good technique with therapeutic exercises, and would benefit from continued PT      Plan: Continue per plan of care.  Progress treatment as tolerated.       Precautions: N/A      Manuals 5/5 5/14 5/21 5/29 6/9 6/24 7/1 7/7 7/15 7/22   UPA Mob C4-C5    MS MS        Thoracic P-A Mob      MS MS   MS   IASTM - Tspine  MS (c-spine) MS c-spine MS scalenes MS MS   MS    Scalene stretch     MS MS        LLLT             UT/LS Stretch  MS MS  MS MS  MS MS MS MS   Cervical Retract+Ext  MS MS MS MS MS  MS MS MS MS   Sub-occipital release  MS MS MS MS  MS MS MS MS   EPAT - R UT        MS     Muscle energy     MS                     Neuro Re-Ed             Diaphragmatic Breathing + ball roll             Pelvic tilting                                                                               Ther Ex             HEP             Supine chin tucks 5x10 6x10 3x10    3x10 3x10 6x10 3x10   Cervical Retractions             Rows Dark green 3x10       Dark green 3x10  Dark green 3x10 Dark green 3x10     PNF D2       Light green 2x10 ea  Light green 3x10 ea Purple 3x10     PNF D1       Light green 2x10 ea  Light green 3x10 ea Purpl 3x10     Lat Pulls Downs             Wall V Red 3x10             Horiz ABD Supine blue 3x10        Purpl 3x10     Seated Thoracic Ext  3x10 3x10     3x10  3x10  3x10      Supine on roll + horiz abd      Blue TB 3x10  Black 3x10  3x10 blue      Supine on roll + shoulder flex       Poll 3# 3x10        ITY on SB 4# 3x10       1# 2x10       PPU        X10       Tall Table med ball roll to side 6# 4x6ea            Child's Pose   2x30s          Lateral jerson pose   2x30s ea           Cat-cow   3x10       3x10   Cat-cow thread the needle   3x30s ea       3x30s ea    Chest press on roll   18# 3x10                                    Ther Activity                                       Gait Training                                       Modalities

## 2025-07-29 ENCOUNTER — OFFICE VISIT (OUTPATIENT)
Dept: PHYSICAL THERAPY | Age: 25
End: 2025-07-29
Attending: ANESTHESIOLOGY
Payer: COMMERCIAL

## 2025-07-29 DIAGNOSIS — M54.16 LUMBAR RADICULOPATHY: Primary | ICD-10-CM

## 2025-07-29 DIAGNOSIS — M54.12 CERVICAL RADICULOPATHY: ICD-10-CM

## 2025-07-29 PROCEDURE — 97110 THERAPEUTIC EXERCISES: CPT

## 2025-07-29 PROCEDURE — 97140 MANUAL THERAPY 1/> REGIONS: CPT

## 2025-08-06 ENCOUNTER — OFFICE VISIT (OUTPATIENT)
Dept: PHYSICAL THERAPY | Age: 25
End: 2025-08-06
Attending: ANESTHESIOLOGY
Payer: COMMERCIAL

## 2025-08-06 DIAGNOSIS — M54.12 CERVICAL RADICULOPATHY: ICD-10-CM

## 2025-08-06 DIAGNOSIS — M54.16 LUMBAR RADICULOPATHY: Primary | ICD-10-CM

## 2025-08-06 PROCEDURE — 97110 THERAPEUTIC EXERCISES: CPT

## 2025-08-06 PROCEDURE — 97140 MANUAL THERAPY 1/> REGIONS: CPT

## 2025-08-11 ENCOUNTER — APPOINTMENT (OUTPATIENT)
Dept: LAB | Facility: CLINIC | Age: 25
End: 2025-08-11
Attending: FAMILY MEDICINE
Payer: COMMERCIAL

## 2025-08-12 ENCOUNTER — OFFICE VISIT (OUTPATIENT)
Dept: PHYSICAL THERAPY | Age: 25
End: 2025-08-12
Attending: ANESTHESIOLOGY
Payer: COMMERCIAL

## 2025-08-12 ENCOUNTER — RESULTS FOLLOW-UP (OUTPATIENT)
Dept: FAMILY MEDICINE CLINIC | Facility: CLINIC | Age: 25
End: 2025-08-12